# Patient Record
Sex: FEMALE | Race: BLACK OR AFRICAN AMERICAN | NOT HISPANIC OR LATINO | URBAN - METROPOLITAN AREA
[De-identification: names, ages, dates, MRNs, and addresses within clinical notes are randomized per-mention and may not be internally consistent; named-entity substitution may affect disease eponyms.]

---

## 2023-03-24 LAB
ALANINE AMINOTRANSFERASE (SGPT) (U/L) IN SER/PLAS: 9 U/L (ref 7–45)
ALBUMIN (G/DL) IN SER/PLAS: 4.4 G/DL (ref 3.4–5)
ALKALINE PHOSPHATASE (U/L) IN SER/PLAS: 74 U/L (ref 33–110)
ANION GAP IN SER/PLAS: 9 MMOL/L (ref 10–20)
ASPARTATE AMINOTRANSFERASE (SGOT) (U/L) IN SER/PLAS: 12 U/L (ref 9–39)
BASOPHILS (10*3/UL) IN BLOOD BY AUTOMATED COUNT: 0.04 X10E9/L (ref 0–0.1)
BASOPHILS/100 LEUKOCYTES IN BLOOD BY AUTOMATED COUNT: 0.4 % (ref 0–2)
BILIRUBIN TOTAL (MG/DL) IN SER/PLAS: 0.5 MG/DL (ref 0–1.2)
CALCIDIOL (25 OH VITAMIN D3) (NG/ML) IN SER/PLAS: 12 NG/ML
CALCIUM (MG/DL) IN SER/PLAS: 9.8 MG/DL (ref 8.6–10.6)
CARBON DIOXIDE, TOTAL (MMOL/L) IN SER/PLAS: 29 MMOL/L (ref 21–32)
CHLORIDE (MMOL/L) IN SER/PLAS: 103 MMOL/L (ref 98–107)
CHOLESTEROL (MG/DL) IN SER/PLAS: 197 MG/DL (ref 0–199)
CHOLESTEROL IN HDL (MG/DL) IN SER/PLAS: 42.5 MG/DL
CHOLESTEROL/HDL RATIO: 4.6
COBALAMIN (VITAMIN B12) (PG/ML) IN SER/PLAS: 409 PG/ML (ref 211–911)
CREATININE (MG/DL) IN SER/PLAS: 0.69 MG/DL (ref 0.5–1.05)
EOSINOPHILS (10*3/UL) IN BLOOD BY AUTOMATED COUNT: 0.27 X10E9/L (ref 0–0.7)
EOSINOPHILS/100 LEUKOCYTES IN BLOOD BY AUTOMATED COUNT: 2.7 % (ref 0–6)
ERYTHROCYTE DISTRIBUTION WIDTH (RATIO) BY AUTOMATED COUNT: 13.2 % (ref 11.5–14.5)
ERYTHROCYTE MEAN CORPUSCULAR HEMOGLOBIN CONCENTRATION (G/DL) BY AUTOMATED: 33 G/DL (ref 32–36)
ERYTHROCYTE MEAN CORPUSCULAR VOLUME (FL) BY AUTOMATED COUNT: 88 FL (ref 80–100)
ERYTHROCYTES (10*6/UL) IN BLOOD BY AUTOMATED COUNT: 4.36 X10E12/L (ref 4–5.2)
ESTIMATED AVERAGE GLUCOSE FOR HBA1C: 108 MG/DL
FERRITIN (UG/LL) IN SER/PLAS: 48 UG/L (ref 8–150)
GFR FEMALE: >90 ML/MIN/1.73M2
GLUCOSE (MG/DL) IN SER/PLAS: 85 MG/DL (ref 74–99)
HEMATOCRIT (%) IN BLOOD BY AUTOMATED COUNT: 38.2 % (ref 36–46)
HEMOGLOBIN (G/DL) IN BLOOD: 12.6 G/DL (ref 12–16)
HEMOGLOBIN A1C/HEMOGLOBIN TOTAL IN BLOOD: 5.4 %
IMMATURE GRANULOCYTES/100 LEUKOCYTES IN BLOOD BY AUTOMATED COUNT: 0.8 % (ref 0–0.9)
INSULIN, FASTING: 16 UIU/ML (ref 3–25)
IRON (UG/DL) IN SER/PLAS: 48 UG/DL (ref 35–150)
IRON BINDING CAPACITY (UG/DL) IN SER/PLAS: 371 UG/DL (ref 240–445)
IRON SATURATION (%) IN SER/PLAS: 13 % (ref 25–45)
LDL: 131 MG/DL (ref 0–99)
LEUKOCYTES (10*3/UL) IN BLOOD BY AUTOMATED COUNT: 10.1 X10E9/L (ref 4.4–11.3)
LYMPHOCYTES (10*3/UL) IN BLOOD BY AUTOMATED COUNT: 3.9 X10E9/L (ref 1.2–4.8)
LYMPHOCYTES/100 LEUKOCYTES IN BLOOD BY AUTOMATED COUNT: 38.8 % (ref 13–44)
MONOCYTES (10*3/UL) IN BLOOD BY AUTOMATED COUNT: 0.59 X10E9/L (ref 0.1–1)
MONOCYTES/100 LEUKOCYTES IN BLOOD BY AUTOMATED COUNT: 5.9 % (ref 2–10)
NEUTROPHILS (10*3/UL) IN BLOOD BY AUTOMATED COUNT: 5.18 X10E9/L (ref 1.2–7.7)
NEUTROPHILS/100 LEUKOCYTES IN BLOOD BY AUTOMATED COUNT: 51.4 % (ref 40–80)
NRBC (PER 100 WBCS) BY AUTOMATED COUNT: 0 /100 WBC (ref 0–0)
PLATELETS (10*3/UL) IN BLOOD AUTOMATED COUNT: 322 X10E9/L (ref 150–450)
POTASSIUM (MMOL/L) IN SER/PLAS: 3.9 MMOL/L (ref 3.5–5.3)
PROTEIN TOTAL: 7 G/DL (ref 6.4–8.2)
SODIUM (MMOL/L) IN SER/PLAS: 137 MMOL/L (ref 136–145)
THYROTROPIN (MIU/L) IN SER/PLAS BY DETECTION LIMIT <= 0.05 MIU/L: 0.45 MIU/L (ref 0.44–3.98)
TRIGLYCERIDE (MG/DL) IN SER/PLAS: 120 MG/DL (ref 0–149)
UREA NITROGEN (MG/DL) IN SER/PLAS: 7 MG/DL (ref 6–23)
VLDL: 24 MG/DL (ref 0–40)

## 2024-11-05 ENCOUNTER — APPOINTMENT (OUTPATIENT)
Dept: OBSTETRICS AND GYNECOLOGY | Facility: HOSPITAL | Age: 34
End: 2024-11-05
Payer: COMMERCIAL

## 2024-11-19 ENCOUNTER — LAB (OUTPATIENT)
Dept: LAB | Facility: LAB | Age: 34
End: 2024-11-19
Payer: COMMERCIAL

## 2024-11-19 ENCOUNTER — OFFICE VISIT (OUTPATIENT)
Dept: OBSTETRICS AND GYNECOLOGY | Facility: HOSPITAL | Age: 34
End: 2024-11-19
Payer: COMMERCIAL

## 2024-11-19 VITALS
DIASTOLIC BLOOD PRESSURE: 93 MMHG | HEIGHT: 63 IN | SYSTOLIC BLOOD PRESSURE: 131 MMHG | BODY MASS INDEX: 44.83 KG/M2 | WEIGHT: 253 LBS

## 2024-11-19 DIAGNOSIS — Z11.3 ROUTINE SCREENING FOR STI (SEXUALLY TRANSMITTED INFECTION): ICD-10-CM

## 2024-11-19 DIAGNOSIS — Z12.4 CERVICAL CANCER SCREENING: ICD-10-CM

## 2024-11-19 DIAGNOSIS — Z01.419 ENCOUNTER FOR GYNECOLOGICAL EXAMINATION WITHOUT ABNORMAL FINDING: Primary | ICD-10-CM

## 2024-11-19 LAB
HCV AB SER QL: NONREACTIVE
HIV 1+2 AB+HIV1 P24 AG SERPL QL IA: NONREACTIVE
TREPONEMA PALLIDUM IGG+IGM AB [PRESENCE] IN SERUM OR PLASMA BY IMMUNOASSAY: NONREACTIVE

## 2024-11-19 PROCEDURE — 87389 HIV-1 AG W/HIV-1&-2 AB AG IA: CPT

## 2024-11-19 PROCEDURE — 86780 TREPONEMA PALLIDUM: CPT

## 2024-11-19 PROCEDURE — 36415 COLL VENOUS BLD VENIPUNCTURE: CPT

## 2024-11-19 PROCEDURE — 99385 PREV VISIT NEW AGE 18-39: CPT | Performed by: ADVANCED PRACTICE MIDWIFE

## 2024-11-19 PROCEDURE — 3008F BODY MASS INDEX DOCD: CPT | Performed by: ADVANCED PRACTICE MIDWIFE

## 2024-11-19 PROCEDURE — 87491 CHLMYD TRACH DNA AMP PROBE: CPT | Performed by: ADVANCED PRACTICE MIDWIFE

## 2024-11-19 PROCEDURE — 86803 HEPATITIS C AB TEST: CPT

## 2024-11-19 PROCEDURE — 99395 PREV VISIT EST AGE 18-39: CPT | Performed by: ADVANCED PRACTICE MIDWIFE

## 2024-11-19 SDOH — ECONOMIC STABILITY: FOOD INSECURITY: WITHIN THE PAST 12 MONTHS, YOU WORRIED THAT YOUR FOOD WOULD RUN OUT BEFORE YOU GOT MONEY TO BUY MORE.: NEVER TRUE

## 2024-11-19 SDOH — ECONOMIC STABILITY: FOOD INSECURITY: WITHIN THE PAST 12 MONTHS, THE FOOD YOU BOUGHT JUST DIDN'T LAST AND YOU DIDN'T HAVE MONEY TO GET MORE.: NEVER TRUE

## 2024-11-19 ASSESSMENT — PATIENT HEALTH QUESTIONNAIRE - PHQ9
1. LITTLE INTEREST OR PLEASURE IN DOING THINGS: SEVERAL DAYS
SUM OF ALL RESPONSES TO PHQ9 QUESTIONS 1 & 2: 4
6. FEELING BAD ABOUT YOURSELF - OR THAT YOU ARE A FAILURE OR HAVE LET YOURSELF OR YOUR FAMILY DOWN: MORE THAN HALF THE DAYS
2. FEELING DOWN, DEPRESSED OR HOPELESS: NEARLY EVERY DAY
4. FEELING TIRED OR HAVING LITTLE ENERGY: SEVERAL DAYS
10. IF YOU CHECKED OFF ANY PROBLEMS, HOW DIFFICULT HAVE THESE PROBLEMS MADE IT FOR YOU TO DO YOUR WORK, TAKE CARE OF THINGS AT HOME, OR GET ALONG WITH OTHER PEOPLE: SOMEWHAT DIFFICULT
SUM OF ALL RESPONSES TO PHQ QUESTIONS 1-9: 11
9. THOUGHTS THAT YOU WOULD BE BETTER OFF DEAD, OR OF HURTING YOURSELF: NOT AT ALL
8. MOVING OR SPEAKING SO SLOWLY THAT OTHER PEOPLE COULD HAVE NOTICED. OR THE OPPOSITE, BEING SO FIGETY OR RESTLESS THAT YOU HAVE BEEN MOVING AROUND A LOT MORE THAN USUAL: NOT AT ALL
3. TROUBLE FALLING OR STAYING ASLEEP: NEARLY EVERY DAY
5. POOR APPETITE OR OVEREATING: SEVERAL DAYS
7. TROUBLE CONCENTRATING ON THINGS, SUCH AS READING THE NEWSPAPER OR WATCHING TELEVISION: NOT AT ALL

## 2024-11-19 ASSESSMENT — ENCOUNTER SYMPTOMS
LOSS OF SENSATION IN FEET: 0
DEPRESSION: 0
OCCASIONAL FEELINGS OF UNSTEADINESS: 0

## 2024-11-19 ASSESSMENT — SOCIAL DETERMINANTS OF HEALTH (SDOH)
WITHIN THE LAST YEAR, HAVE TO BEEN RAPED OR FORCED TO HAVE ANY KIND OF SEXUAL ACTIVITY BY YOUR PARTNER OR EX-PARTNER?: NO
WITHIN THE LAST YEAR, HAVE YOU BEEN KICKED, HIT, SLAPPED, OR OTHERWISE PHYSICALLY HURT BY YOUR PARTNER OR EX-PARTNER?: NO
WITHIN THE LAST YEAR, HAVE YOU BEEN AFRAID OF YOUR PARTNER OR EX-PARTNER?: NO
WITHIN THE LAST YEAR, HAVE YOU BEEN HUMILIATED OR EMOTIONALLY ABUSED IN OTHER WAYS BY YOUR PARTNER OR EX-PARTNER?: NO

## 2024-11-19 ASSESSMENT — PAIN SCALES - GENERAL: PAINLEVEL_OUTOF10: 0-NO PAIN

## 2024-11-19 NOTE — PROGRESS NOTES
"Subjective   Meghan Winston is a 34 y.o. female who is here for Annual Exam (STI TESTING/Last pap 2011/Declined chaperone [facility]).     Concerns today:  None    Periods are regular every 28-30 days, lasting 4 days.   Dysmenorrhea:none.   Cyclic symptoms include none.      Sexual Activity: sexually active, male partner  Pain with intercourse? No   Loss of desire? No   Able to have an orgasm? Yes     History of prior STI: trichomonas  Desires STI screening? Yes    Current contraception: tubal ligation    Last pap: 2020  History of abnormal Pap smear: no  Family history of uterine or ovarian cancer: no  Family history of breast cancer: no    OB History    Para Term  AB Living   5 3 3 0 1 3   SAB IAB Ectopic Multiple Live Births   1 0 0 0 3      Objective   BP (!) 131/93   Ht 1.6 m (5' 3\")   Wt 115 kg (253 lb)   LMP 2024 (Approximate)    Physical Exam  Constitutional:       General: She is not in acute distress.     Appearance: Normal appearance. She is well-developed.   Genitourinary:      Urethral meatus normal.      No lesions in the vagina.      Genitourinary Comments: Menses discharge      Right Labia: No rash, lesions or skin changes.     Left Labia: No lesions, skin changes or rash.     Vaginal discharge present.      No vaginal erythema or bleeding.      No vaginal prolapse present.     No vaginal atrophy present.       Right Adnexa: not tender and no mass present.     Left Adnexa: not tender and no mass present.     Cervix is parous.      No cervical motion tenderness, discharge, friability or lesion.      Uterus is not fixed, tender or irregular.      No uterine mass detected.     Uterus is anteverted.      Pelvic exam was performed with patient in the lithotomy position.   Breasts:     Right: Normal.      Left: Normal.   Cardiovascular:      Heart sounds: Normal heart sounds.   Pulmonary:      Effort: Pulmonary effort is normal.      Breath sounds: Normal breath sounds. "   Abdominal:      Palpations: Abdomen is soft. There is no mass.      Tenderness: There is no abdominal tenderness.   Neurological:      General: No focal deficit present.   Skin:     General: Skin is warm and dry.   Psychiatric:         Mood and Affect: Mood and affect normal.         Behavior: Behavior is cooperative.   Vitals reviewed.        Assessment/Plan   Diagnoses and all orders for this visit:  Encounter for gynecological examination without abnormal finding  -     routine AE  -     healthy lifestyle encouraged  -     pap & HPV collected  -     condoms as needed for STI prevention  -     BTL for contraception  -     will check vaccination record for Gardasil  -     Recommend PCP visit for routine health maintenance  -     RTO 1 year for AE or sooner PRN  Cervical cancer screening  -     THINPREP PAP TEST (>30) collected  Routine screening for STI (sexually transmitted infection)  -     GC/CT, trich from pap  -     Hepatitis C Antibody; Future  -     HIV 1/2 Antigen/Antibody Screen with Reflex to Confirmation; Future  -     Syphilis Screen with Reflex; Future    Follow up in about 1 year (around 11/19/2025) for Annual Exam.  Marycarmen Green, APRN-CNM, APRN-CNP

## 2024-11-20 LAB
C TRACH RRNA SPEC QL NAA+PROBE: NEGATIVE
N GONORRHOEA DNA SPEC QL PROBE+SIG AMP: NEGATIVE

## 2024-11-26 NOTE — PROGRESS NOTES
11/27/2024 CC: 34 y.o. presents for refraction check up    Past ocular history: glasses  Family history: Glaucoma in father and aunt.  Past medical history: Asthma  Social history: smoker    Eye medications:   Both eyes: none    Allergy:  As per chart     Testing:    Pachymetry: 549/555    Assessment:   Refractive error:  -Low myopia/ast.   -MRx    Fhx of glaucoma  - IOP 14 OU, Average C  - Symmetrical CDR 0.3    VANESSA    Plan:   I explained my findings. Mrx dispensed    Eye medications:   Both eyes: Start Ats PRN    Return in 1 yr, Comprehensive eval. (DFE)

## 2024-11-27 ENCOUNTER — APPOINTMENT (OUTPATIENT)
Dept: OPHTHALMOLOGY | Facility: CLINIC | Age: 34
End: 2024-11-27
Payer: COMMERCIAL

## 2024-11-27 DIAGNOSIS — H52.7 REFRACTIVE ERROR: Primary | ICD-10-CM

## 2024-11-27 PROCEDURE — 92004 COMPRE OPH EXAM NEW PT 1/>: CPT | Performed by: OPHTHALMOLOGY

## 2024-11-27 PROCEDURE — 92015 DETERMINE REFRACTIVE STATE: CPT | Performed by: OPHTHALMOLOGY

## 2024-11-27 PROCEDURE — 76514 ECHO EXAM OF EYE THICKNESS: CPT | Performed by: OPHTHALMOLOGY

## 2024-11-27 RX ORDER — ERGOCALCIFEROL 1.25 MG/1
CAPSULE ORAL
COMMUNITY
Start: 2023-04-20

## 2024-11-27 ASSESSMENT — VISUAL ACUITY
OS_CC+: +2
OS_CC: 20/30
OD_CC: 20/30
METHOD: SNELLEN - LINEAR

## 2024-11-27 ASSESSMENT — CUP TO DISC RATIO
OD_RATIO: 0.3
OS_RATIO: 0.3

## 2024-11-27 ASSESSMENT — REFRACTION_MANIFEST
OD_SPHERE: -0.25
OD_AXIS: 002
OD_CYLINDER: SPHERE
OD_CYLINDER: +0.50
OS_CYLINDER: +0.25
OS_CYLINDER: +0.25
METHOD_AUTOREFRACTION: 1
OD_SPHERE: -0.50
OS_SPHERE: -0.25
OS_AXIS: 029
OS_SPHERE: -0.50
OS_AXIS: 029

## 2024-11-27 ASSESSMENT — ENCOUNTER SYMPTOMS
ALLERGIC/IMMUNOLOGIC NEGATIVE: 0
GASTROINTESTINAL NEGATIVE: 0
MUSCULOSKELETAL NEGATIVE: 0
HEMATOLOGIC/LYMPHATIC NEGATIVE: 0
ENDOCRINE NEGATIVE: 0
CARDIOVASCULAR NEGATIVE: 0
PSYCHIATRIC NEGATIVE: 0
CONSTITUTIONAL NEGATIVE: 0
NEUROLOGICAL NEGATIVE: 0
EYES NEGATIVE: 1
RESPIRATORY NEGATIVE: 0

## 2024-11-27 ASSESSMENT — CONF VISUAL FIELD
OD_SUPERIOR_TEMPORAL_RESTRICTION: 0
OS_SUPERIOR_TEMPORAL_RESTRICTION: 0
OS_INFERIOR_NASAL_RESTRICTION: 0
OD_INFERIOR_NASAL_RESTRICTION: 0
OD_INFERIOR_TEMPORAL_RESTRICTION: 0
OD_NORMAL: 1
OS_SUPERIOR_NASAL_RESTRICTION: 0
OS_NORMAL: 1
OS_INFERIOR_TEMPORAL_RESTRICTION: 0
OD_SUPERIOR_NASAL_RESTRICTION: 0

## 2024-11-27 ASSESSMENT — SLIT LAMP EXAM - LIDS
COMMENTS: NORMAL
COMMENTS: NORMAL

## 2024-11-27 ASSESSMENT — TONOMETRY
IOP_METHOD: GOLDMANN APPLANATION
OS_IOP_MMHG: 14
OD_IOP_MMHG: 14

## 2024-11-27 ASSESSMENT — EXTERNAL EXAM - LEFT EYE: OS_EXAM: NORMAL

## 2024-11-27 ASSESSMENT — PACHYMETRY
OS_CT(UM): 555
OD_CT(UM): 549

## 2024-11-27 ASSESSMENT — EXTERNAL EXAM - RIGHT EYE: OD_EXAM: NORMAL

## 2024-12-04 LAB
CYTOLOGY CMNT CVX/VAG CYTO-IMP: NORMAL
HPV HR 12 DNA GENITAL QL NAA+PROBE: NEGATIVE
HPV HR GENOTYPES PNL CVX NAA+PROBE: NEGATIVE
HPV16 DNA SPEC QL NAA+PROBE: NEGATIVE
HPV18 DNA SPEC QL NAA+PROBE: NEGATIVE
LAB AP HPV GENOTYPE QUESTION: YES
LAB AP HPV HR: NORMAL
LAB AP PAP ADDITIONAL TESTS: NORMAL
LABORATORY COMMENT REPORT: NORMAL
LMP START DATE: NORMAL
PATH REPORT.TOTAL CANCER: NORMAL

## 2025-01-03 ENCOUNTER — APPOINTMENT (OUTPATIENT)
Dept: PRIMARY CARE | Facility: CLINIC | Age: 35
End: 2025-01-03
Payer: COMMERCIAL

## 2025-01-30 ENCOUNTER — PHARMACY VISIT (OUTPATIENT)
Dept: PHARMACY | Facility: CLINIC | Age: 35
End: 2025-01-30
Payer: MEDICAID

## 2025-01-30 ENCOUNTER — CLINICAL SUPPORT (OUTPATIENT)
Dept: EMERGENCY MEDICINE | Facility: HOSPITAL | Age: 35
End: 2025-01-30
Payer: COMMERCIAL

## 2025-01-30 ENCOUNTER — APPOINTMENT (OUTPATIENT)
Dept: RADIOLOGY | Facility: HOSPITAL | Age: 35
End: 2025-01-30
Payer: COMMERCIAL

## 2025-01-30 ENCOUNTER — HOSPITAL ENCOUNTER (EMERGENCY)
Facility: HOSPITAL | Age: 35
Discharge: HOME | End: 2025-01-30
Attending: STUDENT IN AN ORGANIZED HEALTH CARE EDUCATION/TRAINING PROGRAM
Payer: COMMERCIAL

## 2025-01-30 VITALS
BODY MASS INDEX: 44.82 KG/M2 | RESPIRATION RATE: 20 BRPM | OXYGEN SATURATION: 96 % | DIASTOLIC BLOOD PRESSURE: 85 MMHG | TEMPERATURE: 97.2 F | SYSTOLIC BLOOD PRESSURE: 135 MMHG | HEIGHT: 63 IN | HEART RATE: 87 BPM

## 2025-01-30 DIAGNOSIS — J06.9 VIRAL URI: Primary | ICD-10-CM

## 2025-01-30 LAB
ATRIAL RATE: 83 BPM
FLUAV RNA RESP QL NAA+PROBE: NOT DETECTED
FLUBV RNA RESP QL NAA+PROBE: NOT DETECTED
P AXIS: 50 DEGREES
P OFFSET: 202 MS
P ONSET: 151 MS
PR INTERVAL: 140 MS
Q ONSET: 221 MS
QRS COUNT: 14 BEATS
QRS DURATION: 94 MS
QT INTERVAL: 398 MS
QTC CALCULATION(BAZETT): 467 MS
QTC FREDERICIA: 443 MS
R AXIS: 35 DEGREES
SARS-COV-2 RNA RESP QL NAA+PROBE: NOT DETECTED
T AXIS: 8 DEGREES
T OFFSET: 420 MS
VENTRICULAR RATE: 83 BPM

## 2025-01-30 PROCEDURE — 99284 EMERGENCY DEPT VISIT MOD MDM: CPT | Performed by: STUDENT IN AN ORGANIZED HEALTH CARE EDUCATION/TRAINING PROGRAM

## 2025-01-30 PROCEDURE — 71046 X-RAY EXAM CHEST 2 VIEWS: CPT

## 2025-01-30 PROCEDURE — RXMED WILLOW AMBULATORY MEDICATION CHARGE

## 2025-01-30 PROCEDURE — 87636 SARSCOV2 & INF A&B AMP PRB: CPT | Performed by: STUDENT IN AN ORGANIZED HEALTH CARE EDUCATION/TRAINING PROGRAM

## 2025-01-30 PROCEDURE — 2500000002 HC RX 250 W HCPCS SELF ADMINISTERED DRUGS (ALT 637 FOR MEDICARE OP, ALT 636 FOR OP/ED): Mod: SE | Performed by: STUDENT IN AN ORGANIZED HEALTH CARE EDUCATION/TRAINING PROGRAM

## 2025-01-30 PROCEDURE — 94640 AIRWAY INHALATION TREATMENT: CPT

## 2025-01-30 PROCEDURE — 99285 EMERGENCY DEPT VISIT HI MDM: CPT | Mod: 25 | Performed by: STUDENT IN AN ORGANIZED HEALTH CARE EDUCATION/TRAINING PROGRAM

## 2025-01-30 PROCEDURE — 93005 ELECTROCARDIOGRAM TRACING: CPT

## 2025-01-30 PROCEDURE — 71046 X-RAY EXAM CHEST 2 VIEWS: CPT | Mod: FOREIGN READ | Performed by: RADIOLOGY

## 2025-01-30 PROCEDURE — 93010 ELECTROCARDIOGRAM REPORT: CPT | Performed by: STUDENT IN AN ORGANIZED HEALTH CARE EDUCATION/TRAINING PROGRAM

## 2025-01-30 RX ORDER — AMOXICILLIN AND CLAVULANATE POTASSIUM 875; 125 MG/1; MG/1
1 TABLET, FILM COATED ORAL EVERY 12 HOURS
Qty: 14 TABLET | Refills: 0 | Status: SHIPPED | OUTPATIENT
Start: 2025-01-30 | End: 2025-01-30

## 2025-01-30 RX ORDER — AMOXICILLIN AND CLAVULANATE POTASSIUM 875; 125 MG/1; MG/1
1 TABLET, FILM COATED ORAL EVERY 12 HOURS
Qty: 14 TABLET | Refills: 0 | Status: SHIPPED | OUTPATIENT
Start: 2025-01-30 | End: 2025-02-06

## 2025-01-30 RX ORDER — IPRATROPIUM BROMIDE AND ALBUTEROL SULFATE 2.5; .5 MG/3ML; MG/3ML
SOLUTION RESPIRATORY (INHALATION)
Status: DISCONTINUED
Start: 2025-01-30 | End: 2025-01-30

## 2025-01-30 RX ADMIN — IPRATROPIUM BROMIDE AND ALBUTEROL SULFATE: .5; 3 SOLUTION RESPIRATORY (INHALATION) at 05:54

## 2025-01-30 ASSESSMENT — PAIN DESCRIPTION - PAIN TYPE: TYPE: ACUTE PAIN

## 2025-01-30 ASSESSMENT — COLUMBIA-SUICIDE SEVERITY RATING SCALE - C-SSRS
1. IN THE PAST MONTH, HAVE YOU WISHED YOU WERE DEAD OR WISHED YOU COULD GO TO SLEEP AND NOT WAKE UP?: NO
2. HAVE YOU ACTUALLY HAD ANY THOUGHTS OF KILLING YOURSELF?: NO
6. HAVE YOU EVER DONE ANYTHING, STARTED TO DO ANYTHING, OR PREPARED TO DO ANYTHING TO END YOUR LIFE?: NO

## 2025-01-30 ASSESSMENT — PAIN SCALES - GENERAL: PAINLEVEL_OUTOF10: 8

## 2025-01-30 ASSESSMENT — PAIN - FUNCTIONAL ASSESSMENT: PAIN_FUNCTIONAL_ASSESSMENT: 0-10

## 2025-01-30 ASSESSMENT — PAIN DESCRIPTION - LOCATION: LOCATION: OTHER (COMMENT)

## 2025-01-30 NOTE — ED TRIAGE NOTES
Patient reports abdominal pain, n/v, back pain, cough, chest tigntness since Thursday. Hx of asthma, she is taking her inhaler and nebulizers @ home. Patient does have wheezing on auscultation. She is able to speak in complete sentences satting 96% on RA.

## 2025-01-30 NOTE — DISCHARGE INSTRUCTIONS
Seen in the emergency department for nasal congestion, cough, and some shortness of breath.  We discussed the workup here in the emergency department is most likely an upper respiratory tract infection.  Please take the Augmentin, twice a day for the next 7 days.  If you develop any worsening shortness of breath, any worsening chest pain that is not present only when you cough, any leg swelling, any feelings of passing out or fever please return the emergency department immediately.  Otherwise please follow-up with your primary care doctor.

## 2025-01-30 NOTE — ED PROVIDER NOTES
"[facility] ED Note    Date of Service: 1/30/2025  Reason for Visit: Flu Symptoms      Patient History     HPI  Meghan Winston is a 35 y.o. female with past medical history of asthma who presents to the emergency department for a week history of URI symptoms as well as generalized myalgias.  Patient states about a week ago she started developing some nasal congestion, intermittently productive cough, with diffuse myalgias.  No fever.  She states that she does have some chest pain only when she coughs, no chest pain in the absence of coughing.  She states that she has had a little bit of shortness of breath consistent with her underlying asthma, she states that she has used albuterol at home with significant improvement.  She states she presented to the ED because of the ongoing symptoms and concern for possible pneumonia given that she is not getting any better.  She denies any abdominal pain, nausea/vomiting, lower extremity edema and rash.      Past Medical History:   Diagnosis Date    Bipolar depression (Multi)      Past Surgical History:   Procedure Laterality Date    TUBAL LIGATION Bilateral          Physical Exam     Vitals:    01/30/25 0213   BP: 135/85   Pulse: 87   Resp: 20   Temp: 36.2 °C (97.2 °F)   TempSrc: Temporal   SpO2: 96%   Height: 1.6 m (5' 3\")     General: Well-appearing female, no acute distress, sitting comfortably in the gurney.  ENT: Nasal congestion appreciated.  Pulmonary: Nonlabored breathing.  Clear breath sounds bilaterally.  No wheezes appreciated.  Symmetric chest rise.  Cardiac:  Regular rate   Abdomen:  Soft. Non-tender. Non-distended  Musculoskeletal: No peripheral edema   Skin:  Dry, no rashes    Diagnostic Studies     Labs:  Please see EMR for labs obtained during this patient encounter.    Radiology:  Please see EMR for imaging obtained during this patient encounter.    EKG:  Normal sinus rhythm, ventricular rate of 83.  Normal axis.  Normal TX interval of 140.  Normal " ventricular conduction with a QRS duration of 94 and a prolonged QTc interval 467.  No Q waves appreciated, normal ST segments, T wave inversions seen in lead III and aVF with T wave flattening seen in lead V3.  Relatively unchanged prior KG on 6/4/2023.    ED Course and MDM     Meghan Winston is a 35 y.o. female with a history and presentation as described above in HPI.      Upon presentation, the patient was afebrile, well-appearing, with unremarkable vital signs.  Patient presented to the emergency department for nasal congestion, cough, and slight shortness of breath.  Differential diagnosis includes ACS, pneumonia, asthma exacerbation or possible viral URI.  Patient did not have any increased work of breathing, did not have any wheezing, did not have any labored breathing with clear breath sounds I have low suspicion for asthma exacerbation.  She did not have any signs of pneumonia or abnormality on her chest x-ray.  Patient did not have any chest pain, chest pain was only present with cough, therefore suspect chest wall pain rather than any true cardiac pain.  EKG did not show any arrhythmias or ischemia.  Patient had a negative flu and COVID.  Given the duration of patient's upper respiratory symptoms, she will be given a 7-day course of Augmentin.  I did discuss with the patient return precautions including any development of chest pain outside of coughing, any worsening shortness of breath, any feelings of fainting, nausea or vomiting preventing her from taking her antibiotics or leg swelling.  Patient understood these risks factors and will plan to return if she experiences any of these.  She is instructed to follow-up with her primary care doctor.      Impression     1. Viral URI         Plan       Discharge, see DCI provided    Jared Ellis MD  [facility] Emergency Medicine         Jared Ellis MD  01/30/25 0751

## 2025-02-07 ENCOUNTER — TELEPHONE (OUTPATIENT)
Dept: SURGERY | Facility: CLINIC | Age: 35
End: 2025-02-07
Payer: COMMERCIAL

## 2025-02-07 NOTE — TELEPHONE ENCOUNTER
Attempted to call patient in regards to starting the bariatric surgery program at [facility]. Due to the surgeon the patient started with left . I reached out to get the patient scheduled with a new surgeon. I called M stating the above and left the office number to call back.

## 2025-02-11 ENCOUNTER — TELEPHONE (OUTPATIENT)
Dept: SLEEP MEDICINE | Facility: CLINIC | Age: 35
End: 2025-02-11
Payer: COMMERCIAL

## 2025-02-11 NOTE — TELEPHONE ENCOUNTER
Patient showed up on my screen as checked in but did not enter the video chat for her scheduled 4:30P appointment. Myself and office staff made two phone calls to her between 4:30P and 4:45P, she did not answer.   A second link was sent to the patient, still did not join the call.   If going to reschedule, recommend in person visit.

## 2025-02-25 ENCOUNTER — APPOINTMENT (OUTPATIENT)
Dept: SURGERY | Facility: CLINIC | Age: 35
End: 2025-02-25
Payer: COMMERCIAL

## 2025-02-27 NOTE — PROGRESS NOTES
Surgeon: Dr. Lino  Patient is considering: RYGB    ASSESSMENT:   Current weight: 251 lbs Ht: 63 In.  BMI: 44.46         Initial start weight: 251 lbs   Pre-Op Excess Body Weight (EBW): 110 lbs   Target Post-Op weight goal: 163-185 lbs     Food allergies/intolerances: Eggs  Chewing/Swallowing/Dentition: Pt reports no problems with chewing/swallowing but partially missing teeth (front two)  Diarrhea/Constipation: No   Smoking/Tobacco use: Yes; pt reports smoking 2 cigars every other day.   Vitamins/Minerals supplements: None   Past diet attempts: Low calorie; Low carb; Starvation   Hours of sleep/night: 6  Current exercise: no regimen. Pt reports just purchasing an at home machine and plans to use it.     DIET RECALL: 1 meal/day on average. Pt reports low hunger.   Breakfast: skips   Lunch: skips   Dinner: usually a protein (ex: baked chicken, a pork chop) with a vegetable (ex: green beans, broccoli) and rice   Snacks: none   Beverages: water, a lot of Ginger Ale per pt, some juice, an occasional tea with honey and lemon, and an occasional coffee with cream and sugar (1-2x/month)  Alcohol: wine every other Friday; pt reports consuming a 4 pack of mini wine bottles (total of 748 ml)     Person responsible for cooking & shopping? Herself   How often do you eat sweet snacks? 2x/week   How often do you eat savory snacks? 2x/week   How often do you eat out? Rarely. Choices include fast food, restaurants, delivery   Do you feel overly stuffed? No   Binge Eating? No   Night Eating? No   Emotional Eating? Occasionally with stress and depression. Food choices include sweets    READINESS TO LEARN:  Motivation to learn: Interested        Understanding of instruction: Good   Anticipated Compliance: Good   Family Support: Unable to assess - family not present          Educational Materials Provided:   Bariatric nutrition guide   Goals sheet     Nutrition assessment completed today. Pt will be scheduled for a video education  class at a later date to discuss the 2 week pre op diet, post op protein and fluid goals, vitamin and mineral supplementation, exercise goals, and post op diet progression.     Nutrition Diagnosis:   1. Overweight/obesity related to excess energy intake as evidenced by BMI = 40 kg/m^2.  2. Food- and nutrition-related knowledge deficit related to lack of prior exposure to surgical weight loss information as evidenced by pt new to surgical program.    Patient was receptive to nutritional recommendations and verbalized understanding of the weight loss surgery diet. Patient expressed understanding about the importance of dietary compliance post-surgery to avoid nutritional deficiencies and achieve optimal weight loss and verbalized intent to follow dietary recommendations.    Nutrition Interventions:   1. Modify type and amount of food and nutrients within meals and snacks.  2. Comprehensive Nutrition Education    Recommendations/goals for next appointment:  1. Incorporate a lunch meal. Provided a tentative meal schedule of: 9am, 12-1pm, 4:30-6:30pm.  2. Reduce liquid calories.   3. Incorporate at home exercise machine 3x/week for at least 10 minutes.     Pre-op Goal weight: lose 5% of body weight    Nutrition Monitoring and Evaluation: 1-2 pound weight loss per week  Criteria: weight check    Patient does meet National Institutes Health guidelines for weight loss surgery, however needs to demonstrate consistent effort in making dietary changes before giving clearance. Will follow up next month and continue to monitor pt's weight, dietary & behavior changes.       Sheri Ennis RD, LDN  Registered Dietitian, Licensed Dietitian Nutritionist

## 2025-03-03 ENCOUNTER — TELEPHONE (OUTPATIENT)
Dept: SURGERY | Facility: CLINIC | Age: 35
End: 2025-03-03
Payer: COMMERCIAL

## 2025-03-11 ENCOUNTER — NUTRITION (OUTPATIENT)
Dept: SURGERY | Facility: CLINIC | Age: 35
End: 2025-03-11
Payer: COMMERCIAL

## 2025-03-11 ENCOUNTER — APPOINTMENT (OUTPATIENT)
Dept: SURGERY | Facility: CLINIC | Age: 35
End: 2025-03-11
Payer: COMMERCIAL

## 2025-03-11 VITALS — BODY MASS INDEX: 44.47 KG/M2 | WEIGHT: 251 LBS | HEIGHT: 63 IN

## 2025-03-11 DIAGNOSIS — E66.01 MORBID OBESITY (MULTI): ICD-10-CM

## 2025-03-17 PROBLEM — Z98.84 BARIATRIC SURGERY STATUS: Status: ACTIVE | Noted: 2025-03-17

## 2025-03-17 PROBLEM — E66.01 MORBID OBESITY (MULTI): Status: ACTIVE | Noted: 2025-03-17

## 2025-03-17 NOTE — H&P (VIEW-ONLY)
Subjective     Date: 3/20/2025 Time: 10:41 AM  Name: Meghan Winston  MRN: [id number]    This is a 35 y.o. female with morbid obesity (Body mass index is 45.35 kg/m².) who presents to clinic for consideration of bariatric surgery. she has attempted and failed multiple diet and exercise regimens for weight loss. Initial Onset of obesity was after pregnancy.  Their goal for surgery is to  be healthier  and lose weight. The patient has tried multiple diets to lose weight including Low Calorie. The patient was most successful with the low calorie diet. The most pounds lost on this diet were 20 lbs. The patient considers their dietary weakness to be  skipping meals and not making healthy food choices.  Most of her weight gain happened following pregnancy.  She did work with our bariatric nurse practitioner a few years ago.  She was on Trulicity for a couple of months, but stopped taking it after she did not see much weight loss.  She is interested in bariatric surgery in order to become healthier.  Currently she does not take any medications.  She was diagnosed at some point with bipolar depression, and was prescribed lamotrigine, but never started it.  She also smokes Black and mild cigars daily.    PSHx: Tubal ligation and  section    SLEEVE Gastric Surgery For Morbid Obesity Laparoscopic Longitudinal Gastrectomy     0 = No symptoms - Denies any GERD symptoms     PMH:   Past Medical History:   Diagnosis Date    Bipolar depression (Multi)         PSH:   Past Surgical History:   Procedure Laterality Date    TUBAL LIGATION Bilateral         FAMILY HISTORY:  Family History   Problem Relation Name Age of Onset    Heart attack Mother  52    Glaucoma Father      Diabetes Mother's Sister      Asthma Mother's Sister      Glaucoma Mother's Sister      Hypertension Mother's Sister      Gout Mother's Sister      Brain Aneurysm Mother's Sister  54    Diabetes Mother's Brother      Breast cancer Neg Hx      Ovarian cancer Neg  Hx      Colon cancer Neg Hx          SOCIAL HISTORY:  Social History     Tobacco Use    Smoking status: Some Days     Types: Cigars    Smokeless tobacco: Never    Tobacco comments:     2 black and milds per day   Vaping Use    Vaping status: Never Used   Substance Use Topics    Alcohol use: Not Currently     Alcohol/week: 3.0 standard drinks of alcohol     Types: 3 Glasses of wine per week     Comment: socially    Drug use: Not Currently     Frequency: 1.0 times per week     Types: Marijuana       MEDICATIONS:  Prior to Admission Medications:  Medication Documentation Review Audit       Reviewed by Yeni Yuen (Technician) on 11/27/24 at 1410      Medication Order Taking? Sig Documenting Provider Last Dose Status   dulaglutide (Trulicity) 0.75 mg/0.5 mL pen injector [id number] Yes Inject under the skin. Historical Provider, MD  Active   ergocalciferol (Vitamin D-2) 1.25 MG (42413 UT) capsule [id number] Yes Take by mouth. Historical Provider, MD  Active   -iron fum-folic acid 29 mg iron- 1 mg tablet [id number]  Take by mouth. Historical Provider, MD  Active                     ALLERGIES:  Allergies   Allergen Reactions    Bee Pollen Itching    Shellfish Derived Hives    Egg Hives, Unknown and Rash    Ibuprofen Hives, Itching and Rash       REVIEW OF SYSTEMS:  GENERAL: Negative for malaise, significant weight loss and fever  HEAD: Negative for headache, swelling.  NECK: Negative for lumps, goiter, pain and significant neck swelling  RESPIRATORY: Negative for cough, wheezing or shortness of breath.  CARDIOVASCULAR: Negative for chest pain, leg swelling or palpitations.  GI: Negative for abdominal discomfort, blood in stools or black stools or change in bowel habits  : No history of dysuria, frequency or incontinence  MUSCULOSKELETAL: Negative for joint pain or swelling, back pain or muscle pain.  SKIN: Negative for lesions, rash, and itching.  PSYCH: Negative for sleep disturbance, mood disorder and recent  psychosocial stressors.  ENDOCRINE: Negative for cold or heat intolerance, polyuria, polydipsia and goiter.    Objective   PHYSICAL EXAM:  Visit Vitals  Wt 116 kg (256 lb)   BMI 45.35 kg/m²   OB Status Having periods   Smoking Status Some Days   BSA 2.27 m²     General appearance: obese, NAD  Neuro: AOx3  Head: EOMI; no swelling or lesions of scalp or face  ENT:  no lumps or lymphadenopathy, thyroid normal to palpation; oropharynx clear, no swelling or erythema  Skin: warm, no erythema or rashes  Lungs: clear to percussion and auscultation  Heart: regular rhythm and S1, S2 normal  Abdomen: soft, non-tender, no masses, no organomegaly  Extremities: Normal exam of the extremities. No swelling or pain.  Psych: no hurried speech, no flight of ideas, normal affect    Assessment/Plan   IMPRESSION:  Meghan Winston is a 35 y.o. female with a BMI of Body mass index is 45.35 kg/m². with the following diagnoses and co-morbidities:     Past Medical History:   Diagnosis Date    Bipolar depression (Multi)        This patient does meet the criteria for a surgical weight loss procedure according to NIH guidelines.  The risks of sleeve gastrectomy, Carlos-en-Y gastric bypass surgery including but not limited to bleeding, leak along staple lines, infection, dehydration, ulcers, internal hernia, DVT/PE, pneumonia, myocardial infarction, prolonged nausea/vomiting, incomplete resolution of associated medical conditions, reflux, weight regain, vitamin/mineral deficiencies, and death have been explained to the patient and Meghan Winston has expressed understanding and acceptance of them.     We discussed the lifestyle changes necessary to be successful following surgery.    The increased risk of substance and alcohol abuse following bariatric surgery was discussed with the patient, along with the negative consequences of substance/alcohol use after surgery including addiction, worsening of mental health disorders, and injury to the  stomach. The risk of smoking and vaping (tobacco or any other substance) after bariatric surgery was explained to the patient. This includes risk of anastamotic ulcers, gastritis, bleeding, perforation, stricture, and PO intolerance.  The patient expressed understanding and acceptance of these risks.    The patient was advised not to become pregnant within 12-18 months following bariatric surgery. She was educated on the increased risks to mother and fetus associated with pregnancy within 2 years of bariatric surgery.    The benefits of the above surgeries including weight loss, improvement/resolution of associated medical and mental health conditions, improved mobility, and decreased mortality have been explained the the patient and Meghan Winston has expressed understanding and acceptance of them.      PLAN:  The plan of treatment for Meghan Winston is to continue with the consultations and tests ordered today in hopes of qualifying for pre-operative clearance for bariatric surgery. This includes:    Consult Nutrition for education and 6 months of MSWL  Consult Psychology  Consult Cardiology  Consult Pulmonology  Labs ordered  EGD  PCP for medical optimization  Consult sleep medicine - concern for RANDY  Recommend at least 10 lbs of weight loss prior to surgery.      The following are some lifestyle changes you should begin to prepare you for your surgery.   Eliminate soda and other carbonated beverages from your diet. Carbonation will not be well tolerated after surgery. Try Propel, Vitamin Water Zero, Sobe Lifewater, Crystal Light or water.    Increase fluid consumption to 64 oz daily. Do not drink within 30 minutes of eating as this will liquefy your food and make you hungry more quickly.    Exercise for 30-60 minutes daily. Brisk walking, bike riding and swimming are all examples of healthy exercise. If you are unable to exercise we recommend seated exercise.    Do not skip meals.    Take a multivitamin  daily.    Lose weight. In preparation for your surgery it is important that you begin making healthier food choices now. Our dietitian will meet with you to help you select foods lower in calories and higher in nutrition. We would like you to lose at least 10 lbs prior to surgery.     Increase your protein intake to 60 grams per day.    Alcohol is empty calories. Please eliminate while preparing for surgery.    Plan your meals.      General Instruction:   1) Use the information we gave you today to work through your insurance requirements and medical clearances.   2) These documents need to get faxed or emailed to the program navigators so they can submit them for approval from your insurance company.   3) Obtain labs today at a  facility. We will call you with any abnormalities and corrections you need to make.   4) Continue to work with your primary care doctor and other specialist so your other health problems are well controlled prior to your surgery.   5) Adopt the recommendations of the program dietician so you develop healthy eating patterns.   6) Work with the sleep team to get your sleep apnea treated to prevent other health problems .   7) Consider attending a support group to learn from other who have been through the process.   8) Come to the MSWL sessions.       60 minutes were spent with patient including history, physical exam, and education.    Kirk Lino MD

## 2025-03-17 NOTE — PATIENT INSTRUCTIONS
The following are some lifestyle changes you should begin to prepare you for your surgery.   Eliminate soda and other carbonated beverages from your diet. Carbonation will not be well tolerated after surgery. Try Propel, Vitamin Water Zero, Sobe Lifewater, Crystal Light or water.    Increase fluid consumption to 64 oz daily. Do not drink within 30 minutes of eating as this will liquefy your food and make you hungry more quickly.    Exercise for 30-60 minutes daily. Brisk walking, bike riding and swimming are all examples of healthy exercise. If you are unable to exercise we recommend seated exercise.    Do not skip meals.    Take a multivitamin daily.    Lose weight. In preparation for your surgery it is important that you begin making healthier food choices now. Our dietitian will meet with you to help you select foods lower in calories and higher in nutrition. We would like you to lose at least 5-10 lbs prior to surgery.     Increase your protein intake to 60 grams per day.    Alcohol is empty calories. Please eliminate while preparing for surgery.    Plan your meals.      General Instruction:   1) Use the information we gave you today to work through your insurance requirements and medical clearances.   2) These documents need to get faxed or emailed to the program navigators so they can submit them for approval from your insurance company.   3) Obtain labs today at a  facility. We will call you with any abnormalities and corrections you need to make.   4) Continue to work with your primary care doctor and other specialist so your other health problems are well controlled prior to your surgery.   5) Adopt the recommendations of the program dietician so you develop healthy eating patterns.   6) Work with the sleep team to get your sleep apnea treated to prevent other health problems .   7) Consider attending a support group to learn from other who have been through the process.   8) Come to the  MS[facility] sessions.

## 2025-03-17 NOTE — PROGRESS NOTES
Subjective     Date: 3/20/2025 Time: 10:41 AM  Name: Meghan Winston  MRN: [id number]    This is a 35 y.o. female with morbid obesity (Body mass index is 45.35 kg/m².) who presents to clinic for consideration of bariatric surgery. she has attempted and failed multiple diet and exercise regimens for weight loss. Initial Onset of obesity was after pregnancy.  Their goal for surgery is to  be healthier  and lose weight. The patient has tried multiple diets to lose weight including Low Calorie. The patient was most successful with the low calorie diet. The most pounds lost on this diet were 20 lbs. The patient considers their dietary weakness to be  skipping meals and not making healthy food choices.  Most of her weight gain happened following pregnancy.  She did work with our bariatric nurse practitioner a few years ago.  She was on Trulicity for a couple of months, but stopped taking it after she did not see much weight loss.  She is interested in bariatric surgery in order to become healthier.  Currently she does not take any medications.  She was diagnosed at some point with bipolar depression, and was prescribed lamotrigine, but never started it.  She also smokes Black and mild cigars daily.    PSHx: Tubal ligation and  section    SLEEVE Gastric Surgery For Morbid Obesity Laparoscopic Longitudinal Gastrectomy     0 = No symptoms - Denies any GERD symptoms     PMH:   Past Medical History:   Diagnosis Date    Bipolar depression (Multi)         PSH:   Past Surgical History:   Procedure Laterality Date    TUBAL LIGATION Bilateral         FAMILY HISTORY:  Family History   Problem Relation Name Age of Onset    Heart attack Mother  52    Glaucoma Father      Diabetes Mother's Sister      Asthma Mother's Sister      Glaucoma Mother's Sister      Hypertension Mother's Sister      Gout Mother's Sister      Brain Aneurysm Mother's Sister  54    Diabetes Mother's Brother      Breast cancer Neg Hx      Ovarian cancer Neg  Hx      Colon cancer Neg Hx          SOCIAL HISTORY:  Social History     Tobacco Use    Smoking status: Some Days     Types: Cigars    Smokeless tobacco: Never    Tobacco comments:     2 black and milds per day   Vaping Use    Vaping status: Never Used   Substance Use Topics    Alcohol use: Not Currently     Alcohol/week: 3.0 standard drinks of alcohol     Types: 3 Glasses of wine per week     Comment: socially    Drug use: Not Currently     Frequency: 1.0 times per week     Types: Marijuana       MEDICATIONS:  Prior to Admission Medications:  Medication Documentation Review Audit       Reviewed by Yeni Yuen (Technician) on 11/27/24 at 1410      Medication Order Taking? Sig Documenting Provider Last Dose Status   dulaglutide (Trulicity) 0.75 mg/0.5 mL pen injector [id number] Yes Inject under the skin. Historical Provider, MD  Active   ergocalciferol (Vitamin D-2) 1.25 MG (89973 UT) capsule [id number] Yes Take by mouth. Historical Provider, MD  Active   -iron fum-folic acid 29 mg iron- 1 mg tablet [id number]  Take by mouth. Historical Provider, MD  Active                     ALLERGIES:  Allergies   Allergen Reactions    Bee Pollen Itching    Shellfish Derived Hives    Egg Hives, Unknown and Rash    Ibuprofen Hives, Itching and Rash       REVIEW OF SYSTEMS:  GENERAL: Negative for malaise, significant weight loss and fever  HEAD: Negative for headache, swelling.  NECK: Negative for lumps, goiter, pain and significant neck swelling  RESPIRATORY: Negative for cough, wheezing or shortness of breath.  CARDIOVASCULAR: Negative for chest pain, leg swelling or palpitations.  GI: Negative for abdominal discomfort, blood in stools or black stools or change in bowel habits  : No history of dysuria, frequency or incontinence  MUSCULOSKELETAL: Negative for joint pain or swelling, back pain or muscle pain.  SKIN: Negative for lesions, rash, and itching.  PSYCH: Negative for sleep disturbance, mood disorder and recent  psychosocial stressors.  ENDOCRINE: Negative for cold or heat intolerance, polyuria, polydipsia and goiter.    Objective   PHYSICAL EXAM:  Visit Vitals  Wt 116 kg (256 lb)   BMI 45.35 kg/m²   OB Status Having periods   Smoking Status Some Days   BSA 2.27 m²     General appearance: obese, NAD  Neuro: AOx3  Head: EOMI; no swelling or lesions of scalp or face  ENT:  no lumps or lymphadenopathy, thyroid normal to palpation; oropharynx clear, no swelling or erythema  Skin: warm, no erythema or rashes  Lungs: clear to percussion and auscultation  Heart: regular rhythm and S1, S2 normal  Abdomen: soft, non-tender, no masses, no organomegaly  Extremities: Normal exam of the extremities. No swelling or pain.  Psych: no hurried speech, no flight of ideas, normal affect    Assessment/Plan   IMPRESSION:  Meghan Winston is a 35 y.o. female with a BMI of Body mass index is 45.35 kg/m². with the following diagnoses and co-morbidities:     Past Medical History:   Diagnosis Date    Bipolar depression (Multi)        This patient does meet the criteria for a surgical weight loss procedure according to NIH guidelines.  The risks of sleeve gastrectomy, Carlos-en-Y gastric bypass surgery including but not limited to bleeding, leak along staple lines, infection, dehydration, ulcers, internal hernia, DVT/PE, pneumonia, myocardial infarction, prolonged nausea/vomiting, incomplete resolution of associated medical conditions, reflux, weight regain, vitamin/mineral deficiencies, and death have been explained to the patient and Meghan Winston has expressed understanding and acceptance of them.     We discussed the lifestyle changes necessary to be successful following surgery.    The increased risk of substance and alcohol abuse following bariatric surgery was discussed with the patient, along with the negative consequences of substance/alcohol use after surgery including addiction, worsening of mental health disorders, and injury to the  stomach. The risk of smoking and vaping (tobacco or any other substance) after bariatric surgery was explained to the patient. This includes risk of anastamotic ulcers, gastritis, bleeding, perforation, stricture, and PO intolerance.  The patient expressed understanding and acceptance of these risks.    The patient was advised not to become pregnant within 12-18 months following bariatric surgery. She was educated on the increased risks to mother and fetus associated with pregnancy within 2 years of bariatric surgery.    The benefits of the above surgeries including weight loss, improvement/resolution of associated medical and mental health conditions, improved mobility, and decreased mortality have been explained the the patient and Meghan Winston has expressed understanding and acceptance of them.      PLAN:  The plan of treatment for Meghan Winston is to continue with the consultations and tests ordered today in hopes of qualifying for pre-operative clearance for bariatric surgery. This includes:    Consult Nutrition for education and 6 months of MS[facility]  Consult Psychology  Consult Cardiology  Consult Pulmonology  Labs ordered  EGD  PCP for medical optimization  Consult sleep medicine - concern for RANDY  Recommend at least 10 lbs of weight loss prior to surgery.      The following are some lifestyle changes you should begin to prepare you for your surgery.   Eliminate soda and other carbonated beverages from your diet. Carbonation will not be well tolerated after surgery. Try Propel, Vitamin Water Zero, Sobe Lifewater, Crystal Light or water.    Increase fluid consumption to 64 oz daily. Do not drink within 30 minutes of eating as this will liquefy your food and make you hungry more quickly.    Exercise for 30-60 minutes daily. Brisk walking, bike riding and swimming are all examples of healthy exercise. If you are unable to exercise we recommend seated exercise.    Do not skip meals.    Take a multivitamin  daily.    Lose weight. In preparation for your surgery it is important that you begin making healthier food choices now. Our dietitian will meet with you to help you select foods lower in calories and higher in nutrition. We would like you to lose at least 10 lbs prior to surgery.     Increase your protein intake to 60 grams per day.    Alcohol is empty calories. Please eliminate while preparing for surgery.    Plan your meals.      General Instruction:   1) Use the information we gave you today to work through your insurance requirements and medical clearances.   2) These documents need to get faxed or emailed to the program navigators so they can submit them for approval from your insurance company.   3) Obtain labs today at a  facility. We will call you with any abnormalities and corrections you need to make.   4) Continue to work with your primary care doctor and other specialist so your other health problems are well controlled prior to your surgery.   5) Adopt the recommendations of the program dietician so you develop healthy eating patterns.   6) Work with the sleep team to get your sleep apnea treated to prevent other health problems .   7) Consider attending a support group to learn from other who have been through the process.   8) Come to the MS[facility] sessions.       60 minutes were spent with patient including history, physical exam, and education.    Kirk Lino MD

## 2025-03-18 ENCOUNTER — APPOINTMENT (OUTPATIENT)
Dept: SURGERY | Facility: CLINIC | Age: 35
End: 2025-03-18
Payer: COMMERCIAL

## 2025-03-20 ENCOUNTER — APPOINTMENT (OUTPATIENT)
Dept: SURGERY | Facility: CLINIC | Age: 35
End: 2025-03-20
Payer: COMMERCIAL

## 2025-03-20 VITALS — WEIGHT: 256 LBS | BODY MASS INDEX: 45.35 KG/M2

## 2025-03-20 DIAGNOSIS — Z98.84 BARIATRIC SURGERY STATUS: ICD-10-CM

## 2025-03-20 DIAGNOSIS — E66.01 MORBID OBESITY (MULTI): ICD-10-CM

## 2025-03-20 PROCEDURE — 99205 OFFICE O/P NEW HI 60 MIN: CPT | Performed by: SURGERY

## 2025-04-11 ENCOUNTER — ANESTHESIA EVENT (OUTPATIENT)
Dept: GASTROENTEROLOGY | Facility: HOSPITAL | Age: 35
End: 2025-04-11
Payer: COMMERCIAL

## 2025-04-11 ENCOUNTER — APPOINTMENT (OUTPATIENT)
Dept: SURGERY | Facility: CLINIC | Age: 35
End: 2025-04-11
Payer: COMMERCIAL

## 2025-04-11 ENCOUNTER — ANESTHESIA (OUTPATIENT)
Dept: GASTROENTEROLOGY | Facility: HOSPITAL | Age: 35
End: 2025-04-11
Payer: COMMERCIAL

## 2025-04-11 ENCOUNTER — HOSPITAL ENCOUNTER (OUTPATIENT)
Dept: GASTROENTEROLOGY | Facility: HOSPITAL | Age: 35
Discharge: HOME | End: 2025-04-11
Payer: COMMERCIAL

## 2025-04-11 VITALS
RESPIRATION RATE: 16 BRPM | WEIGHT: 256 LBS | HEIGHT: 63 IN | OXYGEN SATURATION: 98 % | SYSTOLIC BLOOD PRESSURE: 122 MMHG | BODY MASS INDEX: 45.36 KG/M2 | DIASTOLIC BLOOD PRESSURE: 88 MMHG | TEMPERATURE: 97 F | HEART RATE: 78 BPM

## 2025-04-11 DIAGNOSIS — Z98.84 BARIATRIC SURGERY STATUS: ICD-10-CM

## 2025-04-11 DIAGNOSIS — K29.90 GASTRITIS AND DUODENITIS: Primary | ICD-10-CM

## 2025-04-11 LAB — PREGNANCY TEST URINE, POC: NEGATIVE

## 2025-04-11 PROCEDURE — 7100000009 HC PHASE TWO TIME - INITIAL BASE CHARGE

## 2025-04-11 PROCEDURE — 7100000010 HC PHASE TWO TIME - EACH INCREMENTAL 1 MINUTE

## 2025-04-11 PROCEDURE — 43239 EGD BIOPSY SINGLE/MULTIPLE: CPT | Performed by: SURGERY

## 2025-04-11 PROCEDURE — 7100000001 HC RECOVERY ROOM TIME - INITIAL BASE CHARGE

## 2025-04-11 PROCEDURE — 2500000004 HC RX 250 GENERAL PHARMACY W/ HCPCS (ALT 636 FOR OP/ED): Performed by: ANESTHESIOLOGIST ASSISTANT

## 2025-04-11 PROCEDURE — 81025 URINE PREGNANCY TEST: CPT | Performed by: ANESTHESIOLOGY

## 2025-04-11 PROCEDURE — 7100000002 HC RECOVERY ROOM TIME - EACH INCREMENTAL 1 MINUTE

## 2025-04-11 PROCEDURE — 3700000002 HC GENERAL ANESTHESIA TIME - EACH INCREMENTAL 1 MINUTE

## 2025-04-11 PROCEDURE — 3700000001 HC GENERAL ANESTHESIA TIME - INITIAL BASE CHARGE

## 2025-04-11 RX ORDER — FENTANYL CITRATE 50 UG/ML
50 INJECTION, SOLUTION INTRAMUSCULAR; INTRAVENOUS EVERY 5 MIN PRN
OUTPATIENT
Start: 2025-04-11

## 2025-04-11 RX ORDER — LIDOCAINE HYDROCHLORIDE 10 MG/ML
0.1 INJECTION, SOLUTION INFILTRATION; PERINEURAL ONCE
OUTPATIENT
Start: 2025-04-11 | End: 2025-04-11

## 2025-04-11 RX ORDER — ALBUTEROL SULFATE 0.83 MG/ML
2.5 SOLUTION RESPIRATORY (INHALATION) ONCE AS NEEDED
OUTPATIENT
Start: 2025-04-11

## 2025-04-11 RX ORDER — MIDAZOLAM HYDROCHLORIDE 1 MG/ML
1 INJECTION, SOLUTION INTRAMUSCULAR; INTRAVENOUS ONCE AS NEEDED
OUTPATIENT
Start: 2025-04-11

## 2025-04-11 RX ORDER — HYDRALAZINE HYDROCHLORIDE 20 MG/ML
5 INJECTION INTRAMUSCULAR; INTRAVENOUS EVERY 30 MIN PRN
OUTPATIENT
Start: 2025-04-11

## 2025-04-11 RX ORDER — SODIUM CHLORIDE, SODIUM LACTATE, POTASSIUM CHLORIDE, CALCIUM CHLORIDE 600; 310; 30; 20 MG/100ML; MG/100ML; MG/100ML; MG/100ML
100 INJECTION, SOLUTION INTRAVENOUS CONTINUOUS
OUTPATIENT
Start: 2025-04-11 | End: 2025-04-11

## 2025-04-11 RX ORDER — PROPOFOL 10 MG/ML
INJECTION, EMULSION INTRAVENOUS AS NEEDED
Status: DISCONTINUED | OUTPATIENT
Start: 2025-04-11 | End: 2025-04-11

## 2025-04-11 RX ORDER — OMEPRAZOLE 40 MG/1
40 CAPSULE, DELAYED RELEASE ORAL
Qty: 30 CAPSULE | Refills: 1 | Status: SHIPPED | OUTPATIENT
Start: 2025-04-11 | End: 2025-06-10

## 2025-04-11 RX ORDER — ONDANSETRON HYDROCHLORIDE 2 MG/ML
4 INJECTION, SOLUTION INTRAVENOUS ONCE AS NEEDED
OUTPATIENT
Start: 2025-04-11

## 2025-04-11 RX ADMIN — PROPOFOL 100 MG: 10 INJECTION, EMULSION INTRAVENOUS at 12:29

## 2025-04-11 RX ADMIN — PROPOFOL 100 MG: 10 INJECTION, EMULSION INTRAVENOUS at 12:31

## 2025-04-11 SDOH — HEALTH STABILITY: MENTAL HEALTH: CURRENT SMOKER: 0

## 2025-04-11 ASSESSMENT — COLUMBIA-SUICIDE SEVERITY RATING SCALE - C-SSRS
2. HAVE YOU ACTUALLY HAD ANY THOUGHTS OF KILLING YOURSELF?: NO
6. HAVE YOU EVER DONE ANYTHING, STARTED TO DO ANYTHING, OR PREPARED TO DO ANYTHING TO END YOUR LIFE?: NO
1. IN THE PAST MONTH, HAVE YOU WISHED YOU WERE DEAD OR WISHED YOU COULD GO TO SLEEP AND NOT WAKE UP?: NO

## 2025-04-11 ASSESSMENT — PAIN - FUNCTIONAL ASSESSMENT: PAIN_FUNCTIONAL_ASSESSMENT: 0-10

## 2025-04-11 ASSESSMENT — PAIN SCALES - GENERAL
PAIN_LEVEL: 2
PAINLEVEL_OUTOF10: 0 - NO PAIN

## 2025-04-11 NOTE — ANESTHESIA POSTPROCEDURE EVALUATION
Patient: Meghan Winston    Procedure Summary       Date: 04/11/25 Room / Location: [facility]    Anesthesia Start: 1223 Anesthesia Stop: 1250    Procedure: EGD Diagnosis: Bariatric surgery status    Scheduled Providers: Kirk LANGLEY MD; Alan Valenzuela MD; ELLIOT García Responsible Provider: Alan Valenzuela MD    Anesthesia Type: MAC ASA Status: 2            Anesthesia Type: MAC    Vitals Value Taken Time   /81 04/11/25 1250   Temp 36.1 °C (97 °F) 04/11/25 1242   Pulse 86 04/11/25 1250   Resp 20 04/11/25 1250   SpO2 97 % 04/11/25 1250       Anesthesia Post Evaluation    Patient location during evaluation: PACU  Patient participation: complete - patient participated  Level of consciousness: sleepy but conscious  Pain score: 2  Pain management: adequate  Multimodal analgesia pain management approach  Airway patency: patent  Cardiovascular status: acceptable  Respiratory status: acceptable  Hydration status: acceptable  Postoperative Nausea and Vomiting: none        No notable events documented.

## 2025-04-11 NOTE — ANESTHESIA PREPROCEDURE EVALUATION
Patient: Meghan Winston    Procedure Information       Date/Time: 04/11/25 1230    Scheduled providers: Kirk LANGLEY MD; Alan Valenzuela MD; ELLIOT García    Procedure: EGD    Location: [facility]            Relevant Problems   Endocrine   (+) Morbid obesity (Multi)       Clinical information reviewed:                   NPO Detail:  No data recorded     Physical Exam    Airway  Mallampati: II  TM distance: >3 FB  Neck ROM: full     Cardiovascular - normal exam     Dental - normal exam     Pulmonary - normal exam     Abdominal - normal exam             Anesthesia Plan    History of general anesthesia?: yes  History of complications of general anesthesia?: no    ASA 2     MAC     The patient is not a current smoker.  Patient was not previously instructed to abstain from smoking on day of procedure.  Patient did not smoke on day of procedure.  Education provided regarding risk of obstructive sleep apnea.  intravenous induction   Postoperative administration of opioids is intended.  Trial extubation is planned.  Anesthetic plan and risks discussed with patient.  Use of blood products discussed with patient who consented to blood products.    Plan discussed with ELLIOT, attending and CRNA.

## 2025-04-14 ENCOUNTER — TELEPHONE (OUTPATIENT)
Facility: CLINIC | Age: 35
End: 2025-04-14
Payer: COMMERCIAL

## 2025-04-15 ENCOUNTER — APPOINTMENT (OUTPATIENT)
Dept: SURGERY | Facility: CLINIC | Age: 35
End: 2025-04-15
Payer: COMMERCIAL

## 2025-04-15 VITALS — WEIGHT: 252 LBS | BODY MASS INDEX: 44.64 KG/M2

## 2025-04-15 NOTE — PROGRESS NOTES
PREOPERATIVE, MULTIDISCIPLINARY, MEDICALLY SUPERVISED, REDUCED CALORIE DIET, BEHAVIOR MODIFICATION AND EXERCISE PROGRAM     S: Meghan is following up virtually. She reports that she has been working on incorporating a breakfast and lunch meal this past month. She reports that for breakfast, she has been able to grab a Kind bar from the [email or web address] while on her way to drop off her kid at school. She reports that lunch has been more of a struggle d/t having to go out between 12-1 pm to  her fiance. She reports that she is also working to reduce liquid calories by switching to the small size soda cans. Meghan reports that she has slowed down on smoking, but will seek more methods to help quit when she sees her PCP.     Diet recall: mostly 2 meals/day this past month   Breakfast: a Kind bar   Lunch: mostly skipped   Dinner: yesterday had baked fish, broccoli and mashed potatoes   Snacks: not addressed.   Beverages: water, Vitamin water, small cans of Ginger Ale      Exercise: using at home machines/doing squats with resistance band 2x/week for 15-30 minutes.     O: Today's wt: 252 lbs Ht: 63 In. BMI: 45.35  Initial start weight: 251 lbs   Over the course of the program, pt has a goal to lose 5% of their body weight.     A/P: Pts weight reflects a 1 lb gain since last appt. Today's Lesson: Reviewed patient's dietary changes and food recall. Reviewed better protein bar alteratives to incorporate for breakfast/lunch meal with higher protein, lower sugar. Reviewed working lunch meal into schedule - before she leaves the house, or when she gets back. Reviewed continuing to reduce soda, and switching to vitamin water zero.   Diet Goals: Practice the lifelong rules  Exercise Recommendations: Gradually work up to 150 minutes of moderate intensity exercise per week.  Behavior Goals:  1. Switch to a higher protein, lower sugar bar option.   2. Incorporate a lunch meal.   3. Continue to reduce liquid calories (vitamin  water, soda).   4. Stay consistent with current exercise routine.     Will follow up next month. Will continue to monitor pt's weight, dietary & behavior changes.        Sheri Ennis RD, LDN  Bariatric Dietitian  [phone]

## 2025-04-16 ENCOUNTER — DOCUMENTATION (OUTPATIENT)
Dept: SURGERY | Facility: CLINIC | Age: 35
End: 2025-04-16
Payer: COMMERCIAL

## 2025-04-18 LAB
LAB AP ASR DISCLAIMER: NORMAL
LABORATORY COMMENT REPORT: NORMAL
PATH REPORT.FINAL DX SPEC: NORMAL
PATH REPORT.GROSS SPEC: NORMAL
PATH REPORT.RELEVANT HX SPEC: NORMAL
PATH REPORT.TOTAL CANCER: NORMAL

## 2025-04-25 LAB
ELECTRONICALLY SIGNED BY: NORMAL
H. PYLORI DRUG SUSCEPTIBILITY RESULTS: NORMAL

## 2025-04-25 PROCEDURE — 87900 PHENOTYPE INFECT AGENT DRUG: CPT | Performed by: SURGERY

## 2025-04-28 DIAGNOSIS — A04.8 H. PYLORI INFECTION: ICD-10-CM

## 2025-04-28 RX ORDER — METRONIDAZOLE 500 MG/1
500 TABLET ORAL 3 TIMES DAILY
Qty: 42 TABLET | Refills: 0 | Status: SHIPPED | OUTPATIENT
Start: 2025-04-28 | End: 2025-05-12

## 2025-04-28 RX ORDER — OMEPRAZOLE 40 MG/1
40 CAPSULE, DELAYED RELEASE ORAL
Qty: 28 CAPSULE | Refills: 0 | Status: SHIPPED | OUTPATIENT
Start: 2025-04-28 | End: 2025-05-12

## 2025-04-28 RX ORDER — TETRACYCLINE HYDROCHLORIDE 500 MG/1
500 TABLET, FILM COATED ORAL 4 TIMES DAILY
Qty: 56 EACH | Refills: 0 | Status: SHIPPED | OUTPATIENT
Start: 2025-04-28 | End: 2025-05-12

## 2025-04-28 NOTE — PROGRESS NOTES
Attempted to call patient to discuss her positive H pylori results.  I was unable to leave a VM.  Message sent via [employer] for patient with medication explanation and some information about H pylori infections.

## 2025-04-30 NOTE — PROGRESS NOTES
Patient: Meghan Winston  : 1990 AGE: 35 y.o. SEX:female   MRN: [id number]   Provider: AARON Livingston     Location [facility]   Service Date: 2025     PCP: No Assigned PCP Generic Provider, MD   Referred by: Kirk Lino MD          [facility]  New Visit Note    Virtual or Telephone Consent  An interactive audio and video telecommunication system which permits real time communications between the patient (at the originating site) and provider (at the distant site) was utilized to provide this telehealth service.   Verbal consent was requested and obtained from Meghan Winston on this date, 25 for a telehealth visit and the patient's location was confirmed at the time of the visit.     HISTORY OF PRESENT ILLNESS     Meghan Winston is a 35 y.o. female with a h/o Obesity and GERD who presents to [facility].    25: NPV with concerns of preoperative sleep evaluation, referred by Dr. Lino for pending gastric weight loss surgery. Reports hx of previous snoring, unsure if she still does as she sleep alone. Otherwise asymptomatic. ----> PSG    SLEEP-WAKE SCHEDULE    Sleep Patterns: She does have a usual bed partner. In terms of the patient's sleep/wake cycle, she generally gets into bed at approximately 10-11 PM.  her latency to sleep onset after lights out is <10 min. During the night, the patient generally awakens zero times nightly. Final wake time on weekday mornings is around 6:30 AM. No naps.     Compared to weekdays, the patient's sleep schedule is  similar on the weekends. Wake time 8AM.    Breathing during sleep: snoring  Behaviors at night: No   Sleep paralysis: No   Hypnogogic or hypnopompic hallucinations: No   Cataplexy: No     RLS screen: Patient denies RLS symptoms.    Daytime Symptoms:  On awakening patient reports: waking refreshed    Sleep environment:  Preferred sleep position:  side  Room is dark: Yes  Room is quiet: Yes  Room is cool: Yes  Bed comfort: good    SLEEP HABITS  Caffeine consumption: Yes, Patient consumes caffeine beverage occasionally  Alcohol consumption: Yes, Patient consumes alcohol regularly, about 2-4 drink(s)/week.  Smoking: Yes, smoking black and mild cigars- 1 per day   Marijuana: No  Sleep aids: denies     WEIGHT: fluctuating    ESS: 0      REVIEW OF SYSTEMS     All other systems have been reviewed and are negative.    ALLERGIES     Allergies[1]    MEDICATIONS     Current Medications[2]    PAST HISTORIES     PERTINENT PAST MEDICAL HISTORY: See HPI    PERTINENT PAST SURGICAL HISTORY for Sleep Medicine:  non-contributory    PERTINENT FAMILY HISTORY for Sleep Medicine:  Patient denies family history of any sleep disorder.    PERTINENT SOCIAL HISTORY:  She  reports that she has been smoking cigars. She has never used smokeless tobacco. She reports that she does not currently use alcohol after a past usage of about 3.0 standard drinks of alcohol per week. She reports that she does not currently use drugs after having used the following drugs: Marijuana. Frequency: 1.00 time per week.     Active Problems, Allergy List, Medication List, and PMH/PSH/FH/Social Hx have been reviewed and reconciled in chart. No significant changes unless documented in the pertinent chart section. Updates made when necessary.     PHYSICAL EXAM     VITAL SIGNS: LMP 03/23/2025 (Exact Date)     CURRENT WEIGHT: There were no vitals filed for this visit.     PREVIOUS WEIGHTS:  Wt Readings from Last 3 Encounters:   04/15/25 114 kg (252 lb)   04/11/25 116 kg (256 lb)   03/20/25 116 kg (256 lb)     Limited telehealth examination  PHYSICAL EXAMINATION  General appearance: awake alert in NAD  Affect: normal  HEENT: Nasal congestion absent  Lungs: no cough.  Neuro: normal speech      RESULTS/DATA     Iron (ug/dL)   Date Value   03/24/2023 48     Iron Saturation (%)   Date Value   03/24/2023 13 (L)     TIBC  (ug/dL)   Date Value   03/24/2023 371     Ferritin (ug/L)   Date Value   03/24/2023 48       Bicarbonate   Date Value Ref Range Status   06/04/2023 25 21 - 32 mmol/L Final       ASSESSMENT/PLAN     Ms. Winston is a 35 y.o. female and She was referred to the [facility] for evaluation of possible sleep disordered breathing and bariatric sx clearance    Problem List, Orders, Assessment, Recommendations:    #Sleep disordered breathing/suspected RANDY  - Due to high pre-test probability, I recommend split-night PSG (Split if AHI >10) to evaluate for RANDY.  - Will call with results and order PAP set up via [facility] after SS (if positive)  - Sleep apnea, PAP therapy education as well as the tips to be successful with PAP treatment was provided at length in clinic today. Patient verbalized understanding.  - Discussed 30-day mask guarantee and insurance requirement regarding PAP compliance and follow-up.   - Diet, exercise, and weight loss were emphasized today in clinic, as were non-supine sleep, avoiding alcohol in the late evening, and driving or operating heavy machinery when sleepy.      #BARIATRIC SURGERY STATUS  - we discussed the process for bariatric surgery and clearance  - if sleep study is negative, patient will be cleared by Sleep Medicine, if positive see below:  - if positive, will start on CPAP therapy, will have to order CPAP machine (~2 -3 weeks for machine), after receiving machine will need to show 30 days of compliance, discussed the reason behind utilizing CPAP to maximize healing and weight loss process  - encouraged patient to keep track of steps of process and ensure close follow-up with Sleep medicine providers  - patient verbalized understanding.      #Obesity  BMI Readings from Last 1 Encounters:   04/15/25 44.64 kg/m²     - Encouraged healthy weight loss via diet and exercise  - Weight loss can help in the long term treatment of RANDY.  - Defer management to  bariatrics    #Cigar Smoker  - 1 black and mild per day  - Smoking cessation strongly encouraged, patient aware must be nicotine free for surgery     All of patient's questions were answered. She verbalizes understanding and agreement with my assessment and plan.    Disposition    Follow up 31-90 days after starting PAP therapy if sleep study positive           [1]   Allergies  Allergen Reactions    Bee Pollen Itching    Shellfish Derived Hives    Egg Hives, Unknown and Rash    Ibuprofen Hives, Itching and Rash   [2]   Current Outpatient Medications   Medication Sig Dispense Refill    metroNIDAZOLE (Flagyl) 500 mg tablet Take 1 tablet (500 mg) by mouth 3 times a day for 14 days. 42 tablet 0    omeprazole (PriLOSEC) 40 mg DR capsule Take 1 capsule (40 mg) by mouth once daily in the morning. Take before meals. Do not crush or chew. 30 capsule 1    omeprazole (PriLOSEC) 40 mg DR capsule Take 1 capsule (40 mg) by mouth 2 times a day before meals for 14 days. Do not crush or chew. 28 capsule 0    tetracycline 500 mg tablet Take 500 mg by mouth 4 times a day for 14 days. 56 each 0     No current facility-administered medications for this visit.

## 2025-05-05 ENCOUNTER — APPOINTMENT (OUTPATIENT)
Dept: CARDIOLOGY | Facility: HOSPITAL | Age: 35
End: 2025-05-05
Payer: COMMERCIAL

## 2025-05-08 ENCOUNTER — APPOINTMENT (OUTPATIENT)
Facility: CLINIC | Age: 35
End: 2025-05-08
Payer: COMMERCIAL

## 2025-05-08 DIAGNOSIS — F17.290 CIGAR SMOKER: ICD-10-CM

## 2025-05-08 DIAGNOSIS — E66.01 MORBID OBESITY (MULTI): ICD-10-CM

## 2025-05-08 DIAGNOSIS — Z98.84 BARIATRIC SURGERY STATUS: ICD-10-CM

## 2025-05-08 DIAGNOSIS — G47.30 SLEEP DISORDER BREATHING: Primary | ICD-10-CM

## 2025-05-08 PROCEDURE — 99204 OFFICE O/P NEW MOD 45 MIN: CPT | Performed by: NURSE PRACTITIONER

## 2025-05-08 PROCEDURE — G8433 SCR FOR DEP NOT CPT DOC RSN: HCPCS | Performed by: NURSE PRACTITIONER

## 2025-05-08 ASSESSMENT — COLUMBIA-SUICIDE SEVERITY RATING SCALE - C-SSRS
6. HAVE YOU EVER DONE ANYTHING, STARTED TO DO ANYTHING, OR PREPARED TO DO ANYTHING TO END YOUR LIFE?: NO
2. HAVE YOU ACTUALLY HAD ANY THOUGHTS OF KILLING YOURSELF?: NO
1. IN THE PAST MONTH, HAVE YOU WISHED YOU WERE DEAD OR WISHED YOU COULD GO TO SLEEP AND NOT WAKE UP?: NO

## 2025-05-08 ASSESSMENT — SLEEP AND FATIGUE QUESTIONNAIRES
HOW LIKELY ARE YOU TO NOD OFF OR FALL ASLEEP WHILE LYING DOWN TO REST IN THE AFTERNOON WHEN CIRCUMSTANCES PERMIT: WOULD NEVER DOZE
HOW LIKELY ARE YOU TO NOD OFF OR FALL ASLEEP WHILE WATCHING TV: WOULD NEVER DOZE
HOW LIKELY ARE YOU TO NOD OFF OR FALL ASLEEP WHEN YOU ARE A PASSENGER IN A CAR FOR AN HOUR WITHOUT A BREAK: WOULD NEVER DOZE
ESS-CHAD TOTAL SCORE: 0
HOW LIKELY ARE YOU TO NOD OFF OR FALL ASLEEP WHILE SITTING AND TALKING TO SOMEONE: WOULD NEVER DOZE
HOW LIKELY ARE YOU TO NOD OFF OR FALL ASLEEP IN A CAR, WHILE STOPPED FOR A FEW MINUTES IN TRAFFIC: WOULD NEVER DOZE
HOW LIKELY ARE YOU TO NOD OFF OR FALL ASLEEP WHILE SITTING AND READING: WOULD NEVER DOZE
HOW LIKELY ARE YOU TO NOD OFF OR FALL ASLEEP WHILE SITTING QUIETLY AFTER LUNCH WITHOUT ALCOHOL: WOULD NEVER DOZE
SITING INACTIVE IN A PUBLIC PLACE LIKE A CLASS ROOM OR A MOVIE THEATER: WOULD NEVER DOZE

## 2025-05-08 ASSESSMENT — PATIENT HEALTH QUESTIONNAIRE - PHQ9
SUM OF ALL RESPONSES TO PHQ9 QUESTIONS 1 AND 2: 0
2. FEELING DOWN, DEPRESSED OR HOPELESS: NOT AT ALL
1. LITTLE INTEREST OR PLEASURE IN DOING THINGS: NOT AT ALL

## 2025-05-08 ASSESSMENT — ENCOUNTER SYMPTOMS
LOSS OF SENSATION IN FEET: 0
DEPRESSION: 0
OCCASIONAL FEELINGS OF UNSTEADINESS: 0

## 2025-05-12 PROBLEM — F31.30 BIPOLAR DISORDER, MOST RECENT EPISODE DEPRESSED (MULTI): Status: ACTIVE | Noted: 2025-05-12

## 2025-05-12 PROBLEM — G47.33 OBSTRUCTIVE SLEEP APNEA (ADULT) (PEDIATRIC): Status: ACTIVE | Noted: 2025-05-12

## 2025-05-12 PROBLEM — E88.810 METABOLIC SYNDROME: Status: ACTIVE | Noted: 2025-05-12

## 2025-05-19 ENCOUNTER — APPOINTMENT (OUTPATIENT)
Dept: SURGERY | Facility: CLINIC | Age: 35
End: 2025-05-19
Payer: COMMERCIAL

## 2025-05-19 NOTE — PROGRESS NOTES
PREOPERATIVE, MULTIDISCIPLINARY, MEDICALLY SUPERVISED, REDUCED CALORIE DIET, BEHAVIOR MODIFICATION AND EXERCISE PROGRAM     S: Meghan is following up virtually. She reports that things are going okay this past month. She reports cooking more at home, and has reduced her smoking to 1 cigarette per week. She reports that she feels the urge to smoke, she will grab a piece of minty gum. Pts reported weight reflects a 1 lb loss since last appt.     Diet recall: 2-3 meals/day   Breakfast: a Kind bar   Lunch: sometimes skipping, or grabbing just a piece of fruit, or a turkey sandwich on wheat bread   Dinner: turkey chops with broccoli and yellow rice, or perch fish   Snacks: not addressed.   Beverages: water, Gatorade zero, zero soda   Exercise: less of using at home machines, but more walking more outside this past month.     O: Today's wt: 251 lbs Ht: 63 In. BMI: 44.46   Initial start weight: 251 lbs  Over the course of the program, pt has a goal to lose 5% of their body weight.     A/P: Pts weight reflects a 1 lb loss since last appt. Today's Lesson: Reviewed patient's dietary changes and food recall. Emphasized the importance of 3 meals/day with high protein choices. Meghan mentioned wanting to follow just a water and watermelon diet - we reviewed why I recommended against it. I also again reviewed high protein/low sugar options instead of the Kind bar. I emailed Meghan diet education papers.   Diet Goals: Practice the lifelong rules  Exercise Recommendations: Gradually work up to 150 minutes of moderate intensity exercise per week.  Behavior Goals:  1. Consistently incorporate 3 meals/day.   2. Ensure an intake of at least 20 grams of protein from lean choices.     Will follow up next month. Will continue to monitor pt's weight, dietary & behavior changes.        Sheri Ennis RD, LDN  Bariatric Dietitian  [phone]

## 2025-05-20 VITALS — HEIGHT: 63 IN | BODY MASS INDEX: 44.47 KG/M2 | WEIGHT: 251 LBS

## 2025-05-23 ENCOUNTER — DOCUMENTATION (OUTPATIENT)
Dept: SURGERY | Facility: CLINIC | Age: 35
End: 2025-05-23
Payer: COMMERCIAL

## 2025-05-30 ENCOUNTER — APPOINTMENT (OUTPATIENT)
Facility: CLINIC | Age: 35
End: 2025-05-30
Payer: COMMERCIAL

## 2025-05-30 RX ORDER — BUSPIRONE HYDROCHLORIDE 10 MG/1
10 TABLET ORAL 3 TIMES DAILY
COMMUNITY
Start: 2024-08-02

## 2025-05-30 RX ORDER — TRAZODONE HYDROCHLORIDE 50 MG/1
50 TABLET ORAL NIGHTLY
COMMUNITY
Start: 2024-08-02

## 2025-05-30 RX ORDER — LURASIDONE HYDROCHLORIDE 40 MG/1
40 TABLET, FILM COATED ORAL
COMMUNITY
Start: 2024-08-02

## 2025-06-02 ENCOUNTER — OFFICE VISIT (OUTPATIENT)
Dept: CARDIOLOGY | Facility: HOSPITAL | Age: 35
End: 2025-06-02
Payer: COMMERCIAL

## 2025-06-02 VITALS
OXYGEN SATURATION: 100 % | SYSTOLIC BLOOD PRESSURE: 124 MMHG | DIASTOLIC BLOOD PRESSURE: 84 MMHG | HEART RATE: 75 BPM | WEIGHT: 257 LBS | HEIGHT: 63 IN | BODY MASS INDEX: 45.54 KG/M2

## 2025-06-02 DIAGNOSIS — Z98.84 BARIATRIC SURGERY STATUS: ICD-10-CM

## 2025-06-02 DIAGNOSIS — Z01.810 PRE-OPERATIVE CARDIOVASCULAR EXAMINATION: Primary | ICD-10-CM

## 2025-06-02 DIAGNOSIS — E66.01 MORBID OBESITY (MULTI): ICD-10-CM

## 2025-06-02 PROCEDURE — 3008F BODY MASS INDEX DOCD: CPT | Performed by: STUDENT IN AN ORGANIZED HEALTH CARE EDUCATION/TRAINING PROGRAM

## 2025-06-02 PROCEDURE — 93005 ELECTROCARDIOGRAM TRACING: CPT | Performed by: STUDENT IN AN ORGANIZED HEALTH CARE EDUCATION/TRAINING PROGRAM

## 2025-06-02 PROCEDURE — 99204 OFFICE O/P NEW MOD 45 MIN: CPT | Performed by: STUDENT IN AN ORGANIZED HEALTH CARE EDUCATION/TRAINING PROGRAM

## 2025-06-02 PROCEDURE — 99212 OFFICE O/P EST SF 10 MIN: CPT | Performed by: STUDENT IN AN ORGANIZED HEALTH CARE EDUCATION/TRAINING PROGRAM

## 2025-06-02 ASSESSMENT — ENCOUNTER SYMPTOMS
SYNCOPE: 0
LOSS OF SENSATION IN FEET: 0
IRREGULAR HEARTBEAT: 0
PND: 0
PALPITATIONS: 0
NEAR-SYNCOPE: 0
SHORTNESS OF BREATH: 0
ORTHOPNEA: 0
CLAUDICATION: 0
DYSPNEA ON EXERTION: 0
OCCASIONAL FEELINGS OF UNSTEADINESS: 0
DEPRESSION: 0

## 2025-06-02 ASSESSMENT — PATIENT HEALTH QUESTIONNAIRE - PHQ9
2. FEELING DOWN, DEPRESSED OR HOPELESS: NOT AT ALL
SUM OF ALL RESPONSES TO PHQ9 QUESTIONS 1 AND 2: 0
1. LITTLE INTEREST OR PLEASURE IN DOING THINGS: NOT AT ALL

## 2025-06-02 ASSESSMENT — PAIN SCALES - GENERAL: PAINLEVEL_OUTOF10: 0-NO PAIN

## 2025-06-02 NOTE — PROGRESS NOTES
[facility] - General Cardiology Note                                                                                        Reason for Visit: New Patient Visit.  Referring Clinician: Holland     History of Present Illness  Meghan Winston is a 35 y.o. female with history of morbid obesity, history of prediabetes, who presents today for preoperative risk assessment for bariatric surgery    Patient reports that she has good functional capacity and is able to walk for more than 4 blocks and climb 2 flights of stairs without any shortness of breath or chest discomfort.  She denies any orthopnea, PND, lower extremity swelling, palpitations, syncope, presyncope.  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  She is trying to cut down on smoking cigarettes but she finds it hard to do.    Past Medical History  She has a past medical history of Bipolar depression (Multi).    Past Surgical History  She has a past surgical history that includes Tubal ligation (Bilateral).    Social History  She reports that she has quit smoking. Her smoking use included cigars. She has never used smokeless tobacco. She reports that she does not currently use alcohol after a past usage of about 3.0 standard drinks of alcohol per week. She reports that she does not currently use drugs after having used the following drugs: Marijuana. Frequency: 1.00 time per week.    Family History  Family History[1]    Allergies  Bee pollen, Shellfish derived, Egg, and Ibuprofen    Outpatient Medications  Current Outpatient Medications   Medication Instructions    busPIRone (BUSPAR) 10 mg, 3 times daily    lurasidone (LATUDA) 40 mg, Daily with breakfast    omeprazole (PRILOSEC) 40 mg, oral, Daily before breakfast, Do not crush or chew.    omeprazole (PRILOSEC) 40 mg, oral, 2 times daily before meals, Do not crush or chew.    traZODone (DESYREL) 50 mg, Nightly       Review of  "Systems  Review of Systems   Constitutional: Negative for malaise/fatigue.   Cardiovascular:  Negative for chest pain, claudication, cyanosis, dyspnea on exertion, irregular heartbeat, leg swelling, near-syncope, orthopnea, palpitations, paroxysmal nocturnal dyspnea and syncope.   Respiratory:  Negative for shortness of breath.        Last Recorded Vitals  Vitals:    06/02/25 1132   BP: 124/84   BP Location: Left arm   Patient Position: Sitting   BP Cuff Size: Large adult   Pulse: 75   SpO2: 100%   Weight: 117 kg (257 lb)   Height: 1.6 m (5' 3\")       Physical Examination  General: Obese, -American Female   HEENT: Normocephalic atraumatic, pupils equal and reactive to light, extraocular muscles intact, no conjunctival injection, oropharynx clear without exudates.  Neck: Normal carotid arterial pulses, no arterial bruits, no thyromegaly.  Cardiac: Regular rhythm and normal heart rate.  S1, S2 present and normal.  No murmurs, rubs, or gallops.  PMI is nondisplaced. Jugular venous pulsations are normal.  Pulmonary: Normal breath sounds, no increased work of breathing, no wheezes or crackles.  GI: Normal bowel sounds, abdominal aorta not enlarged, no hepatosplenomegaly, no abdominal bruits.  Lower extremities: No cyanosis, clubbing, or edema.  No xanthelasma present. Normal distal pulses.  Skin: Skin intact. No significant rashes or lesions present.  Neuro: Alert and oriented x 3, normal attention and cognition, no focal motor or sensory neurologic deficits.  Psych: Normal affect and mood.  Musculoskeletal: Normal gait normal muscle tone.    Laboratory Studies  Lab Results   Component Value Date    GLUCOSE 94 06/04/2023    CALCIUM 9.0 06/04/2023     (L) 06/04/2023    K 2.9 (LL) 06/04/2023    CO2 25 06/04/2023    CL 98 06/04/2023    BUN 4 (L) 06/04/2023    CREATININE 0.87 06/04/2023     Lab Results   Component Value Date    ALT 27 06/04/2023    AST 21 06/04/2023    ALKPHOS 72 06/04/2023    BILITOT 0.7 " "06/04/2023         Lab Results   Component Value Date    CHOL 197 03/24/2023     Lab Results   Component Value Date    HDL 42.5 03/24/2023     No results found for: \"LDLCALC\"  Lab Results   Component Value Date    TRIG 120 03/24/2023     No components found for: \"CHOLHDL\"  Lab Results   Component Value Date    HGBA1C 5.4 03/24/2023     No components found for: \"UACR\"    Cardiology Tests  ECG:   ECG 12 lead 06/2/2025   NSR, normal EKG     I personally reviewed the labs and discussed with the patient      Assessment and Plan  Meghan Winston is a 35 y.o. female with history of morbid obesity, history of prediabetes, who presents today for preoperative risk assessment for bariatric surgery    # Pre-operative risk assessment   Patient, undergoing intermediate risk procedure (bariatric surgery), no further cardiac testing is recommended as it is unlikely to change the outcome        Problem List Items Addressed This Visit          Endocrine/Metabolic    Morbid obesity (Multi)    Bariatric surgery status     Other Visit Diagnoses         Pre-operative cardiovascular examination    -  Primary                  Audelia Padilla MD           [1]   Family History  Problem Relation Name Age of Onset    Heart attack Mother  52    Glaucoma Father      Diabetes Mother's Sister      Asthma Mother's Sister      Glaucoma Mother's Sister      Hypertension Mother's Sister      Gout Mother's Sister      Brain Aneurysm Mother's Sister  54    Diabetes Mother's Brother      Breast cancer Neg Hx      Ovarian cancer Neg Hx      Colon cancer Neg Hx       "

## 2025-06-03 LAB
ATRIAL RATE: 77 BPM
P AXIS: 39 DEGREES
P OFFSET: 210 MS
P ONSET: 159 MS
PR INTERVAL: 138 MS
Q ONSET: 228 MS
QRS COUNT: 13 BEATS
QRS DURATION: 86 MS
QT INTERVAL: 402 MS
QTC CALCULATION(BAZETT): 454 MS
QTC FREDERICIA: 436 MS
R AXIS: 14 DEGREES
T AXIS: 20 DEGREES
T OFFSET: 429 MS
VENTRICULAR RATE: 77 BPM

## 2025-06-09 ENCOUNTER — APPOINTMENT (OUTPATIENT)
Dept: BEHAVIORAL HEALTH | Facility: CLINIC | Age: 35
End: 2025-06-09
Payer: COMMERCIAL

## 2025-06-09 DIAGNOSIS — Z98.84 BARIATRIC SURGERY STATUS: ICD-10-CM

## 2025-06-09 DIAGNOSIS — F54 PSYCHOLOGICAL FACTOR AFFECTING PHYSICAL CONDITION: ICD-10-CM

## 2025-06-09 DIAGNOSIS — E66.01 MORBID OBESITY (MULTI): ICD-10-CM

## 2025-06-09 DIAGNOSIS — F31.30 BIPOLAR DISORDER, MOST RECENT EPISODE DEPRESSED (MULTI): ICD-10-CM

## 2025-06-09 PROCEDURE — 90791 PSYCH DIAGNOSTIC EVALUATION: CPT | Performed by: PSYCHOLOGIST
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
## 2025-06-11 ENCOUNTER — DOCUMENTATION (OUTPATIENT)
Dept: SURGERY | Facility: CLINIC | Age: 35
End: 2025-06-11
Payer: COMMERCIAL

## 2025-06-19 ENCOUNTER — APPOINTMENT (OUTPATIENT)
Dept: SURGERY | Facility: CLINIC | Age: 35
End: 2025-06-19
Payer: COMMERCIAL

## 2025-06-19 VITALS — WEIGHT: 259 LBS | HEIGHT: 63 IN | BODY MASS INDEX: 45.89 KG/M2

## 2025-06-19 NOTE — PROGRESS NOTES
PREOPERATIVE, MULTIDISCIPLINARY, MEDICALLY SUPERVISED, REDUCED CALORIE DIET, BEHAVIOR MODIFICATION AND EXERCISE PROGRAM     S: Meghan is present in the office for follow up. She tells me that this past month has not been her best due to a recent death in the family, plus an increase in celebrations. She tells me that she is still only smoking 1 cigarette per week and hopes to try nicotine gum soon. She tells me that she knows what she needs to do to get back on track. She also notes that she made a follow up appt with the therapist as she finds that helpful.     Diet recall: maybe 2 meals/day   Breakfast: a banana and a granola bar   Lunch: unknown  Dinner: 4 pieces of baked chicken, some turkey meat, and a salad with cheese, bell peppers, croutons, and light ranch dressing   Snacks: not addressed.   Beverages: water, diet soda     Exercise: not addressed.     O: Today's wt: 259 lbs Ht: 63 In. BMI: 45.88  Initial start weight:   Over the course of the program, pt has a goal to lose 5% of their body weight.     A/P: Pts weight reflects an 8 lb gain since last appt. Today's Lesson: Reviewed patient's dietary changes and food recall. Provided a handout on recommended protein bars.   Diet Goals: Practice the lifelong rules  Exercise Recommendations: Gradually work up to 150 minutes of moderate intensity exercise per week.  Behavior Goals:  1. Switch to a protein bar instead of a granola bar.     Will follow up next month. Will continue to monitor pt's weight, dietary & behavior changes.        Sheri Ennis, [place], LDN  Bariatric Dietitian  [phone]

## 2025-06-25 ENCOUNTER — APPOINTMENT (OUTPATIENT)
Facility: HOSPITAL | Age: 35
End: 2025-06-25
Payer: COMMERCIAL

## 2025-06-25 VITALS
BODY MASS INDEX: 45.18 KG/M2 | HEART RATE: 86 BPM | TEMPERATURE: 97 F | HEIGHT: 63 IN | DIASTOLIC BLOOD PRESSURE: 76 MMHG | WEIGHT: 255 LBS | SYSTOLIC BLOOD PRESSURE: 108 MMHG | OXYGEN SATURATION: 94 %

## 2025-06-25 DIAGNOSIS — J45.20 MILD INTERMITTENT ASTHMA WITHOUT COMPLICATION (HHS-HCC): Primary | ICD-10-CM

## 2025-06-25 PROCEDURE — 99203 OFFICE O/P NEW LOW 30 MIN: CPT | Performed by: STUDENT IN AN ORGANIZED HEALTH CARE EDUCATION/TRAINING PROGRAM

## 2025-06-25 PROCEDURE — 3008F BODY MASS INDEX DOCD: CPT | Performed by: STUDENT IN AN ORGANIZED HEALTH CARE EDUCATION/TRAINING PROGRAM

## 2025-06-25 PROCEDURE — 99213 OFFICE O/P EST LOW 20 MIN: CPT | Performed by: STUDENT IN AN ORGANIZED HEALTH CARE EDUCATION/TRAINING PROGRAM

## 2025-06-25 ASSESSMENT — PAIN SCALES - GENERAL: PAINLEVEL_OUTOF10: 0-NO PAIN

## 2025-06-25 NOTE — PROGRESS NOTES
"  Medical record number: [id number]    Subjective   Patient ID: Meghan Winston is a 35 y.o. female who presents for Establish Care.    1. Bariatric surgery  Already established with key providers:  Surgeon  Behavioral health  Nutritionist  Needs screening labs, most of which are already ordered and she will obtain soon  Patient gives me the  standard form, which I will be able to complete pending lab work         Social History:  - Lives with 3 daughters, boyfriend who is FOB #4  - Feels safe YES  - Occupation: unemployed  - Tobacco or vaping: YES, down to 1 black n mild weekly  - Alcohol use: NO  - Marijuana use: NO  - Illicit drug use: NO    Family History:  Family History[1]  DM2 in two maternal aunts, 1 maternal uncle    Past Medical History:  Medical History[2]  Eczema of childhood    Past Surgical History:  Surgical History[3]   & tubal ligation (simultaneous)    Review of Systems   All other systems reviewed and are negative.      Objective   /76 (BP Location: Right arm, Patient Position: Sitting)   Pulse 86   Temp 36.1 °C (97 °F) (Temporal)   Ht 1.6 m (5' 3\")   Wt 116 kg (255 lb)   SpO2 94%   BMI 45.17 kg/m²     CBC:   Lab Results   Component Value Date    WBC 11.4 (H) 2023    RBC 3.64 (L) 2023    HGB 10.6 (L) 2023    HCT 30.0 (L) 2023    MCV 82 2023    MCHC 35.3 2023     2023     CBC w/Diff:   Lab Results   Component Value Date    WBC 11.4 (H) 2023    NRBC 0.0 2023    RBC 3.64 (L) 2023    HGB 10.6 (L) 2023    HCT 30.0 (L) 2023    MCV 82 2023    MCHC 35.3 2023     2023    RDW 12.4 2023    NEUTOPHILPCT 68.5 2023    IGPCT 0.5 2023    LYMPHOPCT 19.5 2023    MONOPCT 11.0 2023    EOSPCT 0.2 2023    BASOPCT 0.3 2023    NEUTROABS 7.79 (H) 2023    LYMPHSABS 2.22 2023    MONOSABS 1.25 (H) 2023    EOSABS 0.02 2023    BASOSABS " 0.03 06/04/2023      CMP:  Lab Results   Component Value Date    GLUCOSE 94 06/04/2023     (L) 06/04/2023    K 2.9 (LL) 06/04/2023    CL 98 06/04/2023    CO2 25 06/04/2023    ANIONGAP 14 06/04/2023    BUN 4 (L) 06/04/2023    CREATININE 0.87 06/04/2023    GFRF 89 06/04/2023    CALCIUM 9.0 06/04/2023    ALBUMIN 3.6 06/04/2023    ALKPHOS 72 06/04/2023    PROT 7.4 06/04/2023    AST 21 06/04/2023    BILITOT 0.7 06/04/2023    ALT 27 06/04/2023      BMP:  Lab Results   Component Value Date    GLUCOSE 94 06/04/2023    BUN 4 (L) 06/04/2023    CREATININE 0.87 06/04/2023     (L) 06/04/2023    K 2.9 (LL) 06/04/2023    CL 98 06/04/2023    CO2 25 06/04/2023    ANIONGAP 14 06/04/2023    CALCIUM 9.0 06/04/2023     HgA1c:   Lab Results   Component Value Date    HGBA1C 5.4 03/24/2023    EDLZXMTP2K 108 03/24/2023     TSH:   Lab Results   Component Value Date    TSH 0.45 03/24/2023      Lipid:   Lab Results   Component Value Date    CHOL 197 03/24/2023    HDL 42.5 03/24/2023    CHHDL 4.6 03/24/2023    LDLF 131 (H) 03/24/2023    VLDL 24 03/24/2023    TRIG 120 03/24/2023     Physical Exam  Vitals reviewed.   Constitutional:       Appearance: Normal appearance. She is obese.   HENT:      Head: Normocephalic and atraumatic.      Mouth/Throat:      Mouth: Mucous membranes are moist.      Pharynx: Oropharynx is clear.   Eyes:      Extraocular Movements: Extraocular movements intact.      Conjunctiva/sclera: Conjunctivae normal.      Pupils: Pupils are equal, round, and reactive to light.   Cardiovascular:      Rate and Rhythm: Normal rate and regular rhythm.      Pulses: Normal pulses.      Heart sounds: Normal heart sounds.   Pulmonary:      Effort: Pulmonary effort is normal.      Breath sounds: Normal breath sounds.   Abdominal:      General: Abdomen is flat. Bowel sounds are normal.      Palpations: Abdomen is soft.   Musculoskeletal:         General: Normal range of motion.      Cervical back: Normal range of motion and neck  supple.   Skin:     General: Skin is warm and dry.      Capillary Refill: Capillary refill takes less than 2 seconds.   Neurological:      General: No focal deficit present.      Mental Status: She is alert.   Psychiatric:         Mood and Affect: Mood normal.         Behavior: Behavior normal.         Assessment/Plan   Problem List Items Addressed This Visit    None  Visit Diagnoses         Codes      Mild intermittent asthma without complication ([facility])    -  Primary J45.20    Relevant Medications    albuterol 90 mcg/actuation inhaler               Note: this documentation was entered into the electronic medical record using [employer] medical dictation software, and was not necessarily edited thereafter. Please excuse any errors of spelling or grammar.           [1]   Family History  Problem Relation Name Age of Onset    Heart attack Mother  52    Glaucoma Father      Diabetes Mother's Sister      Asthma Mother's Sister      Glaucoma Mother's Sister      Hypertension Mother's Sister      Gout Mother's Sister      Brain Aneurysm Mother's Sister  54    Diabetes Mother's Brother      Breast cancer Neg Hx      Ovarian cancer Neg Hx      Colon cancer Neg Hx      Glaucoma Father Miguel Winston     Asthma Father Miguel Winston     Depression Mother Alexandria Lee     Heart attack Mother Alexandria Lee     Mental illness Mother Alexandria Lee     Diabetes Mother's Brother Noearcenio Lee     Hypertension Mother's Sister Esther Lujan    [2]   Past Medical History:  Diagnosis Date    Allergic     Anxiety     Asthma     Bipolar depression (Multi)     Depression     Headache    [3]   Past Surgical History:  Procedure Laterality Date     SECTION, LOW TRANSVERSE  2022    TUBAL LIGATION Bilateral

## 2025-06-26 ENCOUNTER — PHARMACY VISIT (OUTPATIENT)
Dept: PHARMACY | Facility: CLINIC | Age: 35
End: 2025-06-26
Payer: MEDICAID

## 2025-06-26 PROCEDURE — RXMED WILLOW AMBULATORY MEDICATION CHARGE

## 2025-06-26 RX ORDER — ALBUTEROL SULFATE 90 UG/1
2 INHALANT RESPIRATORY (INHALATION) EVERY 6 HOURS PRN
Qty: 18 G | Refills: 11 | Status: SHIPPED | OUTPATIENT
Start: 2025-06-26 | End: 2026-06-26

## 2025-07-10 ENCOUNTER — DOCUMENTATION (OUTPATIENT)
Dept: SURGERY | Facility: CLINIC | Age: 35
End: 2025-07-10
Payer: COMMERCIAL

## 2025-07-15 PROCEDURE — RXMED WILLOW AMBULATORY MEDICATION CHARGE

## 2025-07-20 ENCOUNTER — CLINICAL SUPPORT (OUTPATIENT)
Dept: SLEEP MEDICINE | Facility: CLINIC | Age: 35
End: 2025-07-20
Payer: COMMERCIAL

## 2025-07-20 DIAGNOSIS — G47.33 OBSTRUCTIVE SLEEP APNEA (ADULT) (PEDIATRIC): ICD-10-CM

## 2025-07-20 DIAGNOSIS — Z98.84 BARIATRIC SURGERY STATUS: ICD-10-CM

## 2025-07-20 DIAGNOSIS — G47.30 SLEEP DISORDER BREATHING: ICD-10-CM

## 2025-07-20 DIAGNOSIS — E66.01 MORBID OBESITY (MULTI): ICD-10-CM

## 2025-07-20 PROCEDURE — 95810 POLYSOM 6/> YRS 4/> PARAM: CPT | Performed by: PSYCHIATRY & NEUROLOGY

## 2025-07-21 NOTE — PROGRESS NOTES
[facility] TECH NOTE:     Patient: Meghan Winston   MRN//AGE: [id number]  1990  35 y.o.   Technologist: Alba Quesada   Room: 441A   Service Date: 2025        Sleep Testing Location: [facility]:     TECHNOLOGIST SLEEP STUDY PROCEDURE NOTE:   This sleep study is being conducted according to the policies and procedures outlined by the AAS accreditation standards.  The sleep study procedure and processes involved during this appointment was explained to the patient/patient’s family, questions were answered. The patient/family verbalized understanding.      The patient is a 35 y.o. year old female scheduled for aDiagnostic PSG Split night with montage of:  PSG. she arrived for her appointment.      The study that was ultimately completed was aDiagnostic PSG Split night with montage of:  PSG.    The full study Was completed.  Patient questionnaires completed?: yes     Consents signed? yes    Initial Fall Risk Screening:     Meghan has not fallen in the last 6 months. her did not result in injury. Meghan does not have a fear of falling. He does not need assistance with sitting, standing, or walking. she does not need assistance walking in her home. she does not need assistance in an unfamiliar setting. The patient is notusing an assistive device.     Brief Study observations: PT did not qualify for split night d/t AHI < 10 CMS     Deviation to order/protocol and reason: N/A      If PAP, which was preferred mask/pressure/mode: N/A      Other:None    After the procedure, the patient/family was informed to ensure followup with ordering clinician for testing results.      Technologist: Alba Quesada

## 2025-07-23 ENCOUNTER — APPOINTMENT (OUTPATIENT)
Dept: BEHAVIORAL HEALTH | Facility: CLINIC | Age: 35
End: 2025-07-23
Payer: COMMERCIAL

## 2025-07-23 DIAGNOSIS — E66.01 MORBID OBESITY (MULTI): ICD-10-CM

## 2025-07-23 DIAGNOSIS — F54 PSYCHOLOGICAL FACTOR AFFECTING PHYSICAL CONDITION: ICD-10-CM

## 2025-07-23 DIAGNOSIS — Z98.84 BARIATRIC SURGERY STATUS: ICD-10-CM

## 2025-07-23 PROCEDURE — 90834 PSYTX W PT 45 MINUTES: CPT | Performed by: PSYCHOLOGIST

## 2025-07-23 ASSESSMENT — PROMIS GLOBAL HEALTH SCALE
CARRYOUT_SOCIAL_ACTIVITIES: EXCELLENT
RATE_SOCIAL_SATISFACTION: EXCELLENT
RATE_AVERAGE_PAIN: 2
EMOTIONAL_PROBLEMS: NEVER
CARRYOUT_PHYSICAL_ACTIVITIES: COMPLETELY
RATE_GENERAL_HEALTH: EXCELLENT
RATE_QUALITY_OF_LIFE: EXCELLENT
RATE_PHYSICAL_HEALTH: EXCELLENT
RATE_MENTAL_HEALTH: EXCELLENT

## 2025-07-23 ASSESSMENT — PATIENT HEALTH QUESTIONNAIRE - PHQ9
SUM OF ALL RESPONSES TO PHQ9 QUESTIONS 1 & 2: 0
1. LITTLE INTEREST OR PLEASURE IN DOING THINGS: NOT AT ALL
4. FEELING TIRED OR HAVING LITTLE ENERGY: NOT AT ALL
6. FEELING BAD ABOUT YOURSELF - OR THAT YOU ARE A FAILURE OR HAVE LET YOURSELF OR YOUR FAMILY DOWN: NOT AT ALL
5. POOR APPETITE OR OVEREATING: NOT AT ALL
2. FEELING DOWN, DEPRESSED OR HOPELESS: NOT AT ALL
7. TROUBLE CONCENTRATING ON THINGS, SUCH AS READING THE NEWSPAPER OR WATCHING TELEVISION: NOT AT ALL
3. TROUBLE FALLING OR STAYING ASLEEP: NOT AT ALL
SUM OF ALL RESPONSES TO PHQ QUESTIONS 1-9: 0
9. THOUGHTS THAT YOU WOULD BE BETTER OFF DEAD, OR OF HURTING YOURSELF: NOT AT ALL
8. MOVING OR SPEAKING SO SLOWLY THAT OTHER PEOPLE COULD HAVE NOTICED. OR THE OPPOSITE, BEING SO FIGETY OR RESTLESS THAT YOU HAVE BEEN MOVING AROUND A LOT MORE THAN USUAL: NOT AT ALL

## 2025-07-23 ASSESSMENT — ANXIETY QUESTIONNAIRES
GAD7 TOTAL SCORE: 0
1. FEELING NERVOUS, ANXIOUS, OR ON EDGE: NOT AT ALL
5. BEING SO RESTLESS THAT IT IS HARD TO SIT STILL: NOT AT ALL
2. NOT BEING ABLE TO STOP OR CONTROL WORRYING: NOT AT ALL
6. BECOMING EASILY ANNOYED OR IRRITABLE: NOT AT ALL
4. TROUBLE RELAXING: NOT AT ALL
3. WORRYING TOO MUCH ABOUT DIFFERENT THINGS: NOT AT ALL
7. FEELING AFRAID AS IF SOMETHING AWFUL MIGHT HAPPEN: NOT AT ALL
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
## 2025-07-24 ENCOUNTER — OFFICE VISIT (OUTPATIENT)
Facility: HOSPITAL | Age: 35
End: 2025-07-24
Payer: COMMERCIAL

## 2025-07-24 ENCOUNTER — PHARMACY VISIT (OUTPATIENT)
Dept: PHARMACY | Facility: CLINIC | Age: 35
End: 2025-07-24
Payer: MEDICAID

## 2025-07-24 VITALS
DIASTOLIC BLOOD PRESSURE: 84 MMHG | TEMPERATURE: 98 F | WEIGHT: 258 LBS | OXYGEN SATURATION: 98 % | SYSTOLIC BLOOD PRESSURE: 124 MMHG | BODY MASS INDEX: 45.71 KG/M2 | RESPIRATION RATE: 18 BRPM | HEIGHT: 63 IN | HEART RATE: 67 BPM

## 2025-07-24 DIAGNOSIS — Z71.6 ENCOUNTER FOR TOBACCO USE CESSATION COUNSELING: Primary | ICD-10-CM

## 2025-07-24 PROCEDURE — 99213 OFFICE O/P EST LOW 20 MIN: CPT | Performed by: STUDENT IN AN ORGANIZED HEALTH CARE EDUCATION/TRAINING PROGRAM

## 2025-07-24 PROCEDURE — RXMED WILLOW AMBULATORY MEDICATION CHARGE

## 2025-07-24 PROCEDURE — 3008F BODY MASS INDEX DOCD: CPT | Performed by: STUDENT IN AN ORGANIZED HEALTH CARE EDUCATION/TRAINING PROGRAM

## 2025-07-24 RX ORDER — BUPROPION HYDROCHLORIDE 150 MG/1
150 TABLET ORAL EVERY MORNING
Qty: 30 TABLET | Refills: 5 | Status: SHIPPED | OUTPATIENT
Start: 2025-07-24 | End: 2026-01-20

## 2025-07-24 ASSESSMENT — PAIN SCALES - GENERAL: PAINLEVEL_OUTOF10: 4

## 2025-07-24 NOTE — PROGRESS NOTES
"  Subjective   Patient ID: Meghan Winston is a 35 y.o. female who presents for tobacco cessation counseling.     1. Tobacco cessation counseling  Did not tolerate the nicotine gum peppery flavor  Neighbor had patches that she tried, but they came off her skin easily  Still currently using 1-2 Black n' Milds daily (2 max, most days is 1)  Social support from 17 yo daughter who wants her to quit  Extrinsic motivation, she cannot be smoking when bariatric surgery comes due  Father of her children does smoke as well, but keeps her honest about her commitment  Sister comes around socially and also smokes, and patient needs to have conversation with her about boundaries re: tobacco  Relates that she was seen at [facility] by psychiatry and psychology  Now only following with counselor, not psychiatry    A/  Review of meds: previously on Latuda, Buspar, and trazodone  Review of notes: she appears to have suffered from PTSD with psychosis during a period of chronic illness in a child, but has not exhbited any such symptoms in some time  Lungs CTAB    P/  [ ] wellbutrin  mg  [ ] counseled on synergy with idealizing social/external environment for better success in cessation         Review of Systems   All other systems reviewed and are negative.      Objective   /84 (BP Location: Right arm, Patient Position: Sitting, BP Cuff Size: Adult)   Pulse 67   Temp 36.7 °C (98 °F) (Temporal)   Resp 18   Ht 1.6 m (5' 3\")   Wt 117 kg (258 lb)   SpO2 98%   BMI 45.70 kg/m²     Physical Exam  Constitutional:       Appearance: Normal appearance. She is obese.      Comments: BMI 45   HENT:      Head: Normocephalic and atraumatic.      Mouth/Throat:      Mouth: Mucous membranes are moist.      Pharynx: Oropharynx is clear.     Eyes:      Extraocular Movements: Extraocular movements intact.      Conjunctiva/sclera: Conjunctivae normal.      Pupils: Pupils are equal, round, and reactive to light.       Cardiovascular: "      Rate and Rhythm: Normal rate and regular rhythm.      Pulses: Normal pulses.      Heart sounds: Normal heart sounds.   Pulmonary:      Effort: Pulmonary effort is normal.      Breath sounds: Normal breath sounds.   Abdominal:      General: Abdomen is flat. Bowel sounds are normal.      Palpations: Abdomen is soft.     Musculoskeletal:         General: Normal range of motion.      Cervical back: Normal range of motion and neck supple.     Skin:     General: Skin is warm and dry.      Capillary Refill: Capillary refill takes less than 2 seconds.     Neurological:      General: No focal deficit present.      Mental Status: She is alert.     Psychiatric:         Mood and Affect: Mood normal.         Behavior: Behavior normal.         Assessment/Plan   Problem List Items Addressed This Visit    None  Visit Diagnoses         Codes      Encounter for tobacco use cessation counseling    -  Primary Z71.6    Relevant Medications    buPROPion XL (Wellbutrin XL) 150 mg 24 hr tablet               Note: This documentaion was entered into the electronic medical record using [employer] medical dictation software, and was not necessarily edited thereafter. Please excuse any errors of spelling or grammar.

## 2025-07-25 LAB
25(OH)D3+25(OH)D2 SERPL-MCNC: 15 NG/ML (ref 30–100)
ALBUMIN SERPL-MCNC: 4.3 G/DL (ref 3.6–5.1)
ALP SERPL-CCNC: 52 U/L (ref 31–125)
ALT SERPL-CCNC: 11 U/L (ref 6–29)
ANION GAP SERPL CALCULATED.4IONS-SCNC: 9 MMOL/L (CALC) (ref 7–17)
APTT PPP: 29 SEC (ref 23–32)
AST SERPL-CCNC: 11 U/L (ref 10–30)
BASOPHILS # BLD AUTO: 34 CELLS/UL (ref 0–200)
BASOPHILS NFR BLD AUTO: 0.4 %
BILIRUB SERPL-MCNC: 0.5 MG/DL (ref 0.2–1.2)
BUN SERPL-MCNC: 10 MG/DL (ref 7–25)
C PEPTIDE SERPL-MCNC: 2.82 NG/ML (ref 0.8–3.85)
CALCIUM SERPL-MCNC: 9.1 MG/DL (ref 8.6–10.2)
CHLORIDE SERPL-SCNC: 105 MMOL/L (ref 98–110)
CHOLEST SERPL-MCNC: 206 MG/DL
CHOLEST/HDLC SERPL: 3.6 (CALC)
CO2 SERPL-SCNC: 20 MMOL/L (ref 20–32)
COPPER BLD-MCNC: NORMAL UG/DL
COTININE SERPL-MCNC: NORMAL NG/ML
CREAT SERPL-MCNC: 0.74 MG/DL (ref 0.5–0.97)
CRP SERPL-MCNC: 3.4 MG/L
EGFRCR SERPLBLD CKD-EPI 2021: 107 ML/MIN/1.73M2
EOSINOPHIL # BLD AUTO: 168 CELLS/UL (ref 15–500)
EOSINOPHIL NFR BLD AUTO: 2 %
ERYTHROCYTE [DISTWIDTH] IN BLOOD BY AUTOMATED COUNT: 14.1 % (ref 11–15)
EST. AVERAGE GLUCOSE BLD GHB EST-MCNC: 111 MG/DL
EST. AVERAGE GLUCOSE BLD GHB EST-SCNC: 6.2 MMOL/L
FERRITIN SERPL-MCNC: 31 NG/ML (ref 16–154)
FOLATE SERPL-MCNC: 3.3 NG/ML
GLUCOSE SERPL-MCNC: 91 MG/DL (ref 65–99)
HBA1C MFR BLD: 5.5 %
HCT VFR BLD AUTO: 42.9 % (ref 35–45)
HDLC SERPL-MCNC: 57 MG/DL
HGB BLD-MCNC: 13.9 G/DL (ref 11.7–15.5)
INR PPP: 0.9
IRON SATN MFR SERPL: 20 % (CALC) (ref 16–45)
IRON SERPL-MCNC: 74 MCG/DL (ref 40–190)
LDLC SERPL CALC-MCNC: 125 MG/DL (CALC)
LYMPHOCYTES # BLD AUTO: 2990 CELLS/UL (ref 850–3900)
LYMPHOCYTES NFR BLD AUTO: 35.6 %
MCH RBC QN AUTO: 31.1 PG (ref 27–33)
MCHC RBC AUTO-ENTMCNC: 32.4 G/DL (ref 32–36)
MCV RBC AUTO: 96 FL (ref 80–100)
MONOCYTES # BLD AUTO: 689 CELLS/UL (ref 200–950)
MONOCYTES NFR BLD AUTO: 8.2 %
NEUTROPHILS # BLD AUTO: 4519 CELLS/UL (ref 1500–7800)
NEUTROPHILS NFR BLD AUTO: 53.8 %
NICOTINE SERPL-MCNC: NORMAL NG/ML
NONHDLC SERPL-MCNC: 149 MG/DL (CALC)
PLATELET # BLD AUTO: 241 THOUSAND/UL (ref 140–400)
PMV BLD REES-ECKER: 9.8 FL (ref 7.5–12.5)
POTASSIUM SERPL-SCNC: 4.2 MMOL/L (ref 3.5–5.3)
PROT SERPL-MCNC: 7 G/DL (ref 6.1–8.1)
PROTHROMBIN TIME: 10.2 SEC (ref 9–11.5)
PTH-INTACT SERPL-MCNC: 46 PG/ML (ref 16–77)
RBC # BLD AUTO: 4.47 MILLION/UL (ref 3.8–5.1)
SODIUM SERPL-SCNC: 134 MMOL/L (ref 135–146)
T4 FREE SERPL-MCNC: 1 NG/DL (ref 0.8–1.8)
TIBC SERPL-MCNC: 377 MCG/DL (CALC) (ref 250–450)
TRIGL SERPL-MCNC: 129 MG/DL
TSH SERPL-ACNC: 0.74 MIU/L
UREA BREATH TEST QL: DETECTED
VIT A SERPL-MCNC: NORMAL UG/ML
VIT B1 BLD-SCNC: NORMAL NMOL/L
VIT B12 SERPL-MCNC: 347 PG/ML (ref 200–1100)
WBC # BLD AUTO: 8.4 THOUSAND/UL (ref 3.8–10.8)
ZINC SERPL-MCNC: NORMAL UG/ML

## 2025-07-28 DIAGNOSIS — G47.33 OSA (OBSTRUCTIVE SLEEP APNEA): Primary | ICD-10-CM

## 2025-07-28 NOTE — PROGRESS NOTES
PREOPERATIVE, MULTIDISCIPLINARY, MEDICALLY SUPERVISED, REDUCED CALORIE DIET, BEHAVIOR MODIFICATION AND EXERCISE PROGRAM     S: Meghan is following up virtually. She reports doing well overall, besides currently being sick with a cold which has caused a low appetite since this past Saturday. Meghan reports that she is continuing to follow up with her therapist weekly and that it is helpful. She states that she saw her PCP on 7/24 for tobacco use cessation counseling. She reports that she was started on Wellbutrin to help with smoking cravings and anxiety which has helped. Meghan tells me that she was able to try a 15 gram protein bar from [employer], however she did not know the brand name.   Her weight from the PCP appt on 7/24 reflects a 1 lb weight loss since our last appt.     Diet recall: prior to getting sick. Pt notes not really eating much since being sick.   Breakfast: a protein bar   Lunch: a protein bar   Dinner: not addressed.   Snacks: a protein bar   Beverages: water (pt estimates 4 bottles/day, or ~68 oz), a cup of orange juice daily, Gatorade Zero     Exercise: yard work and more movement at home     O: Today's wt: 258 lbs Ht: 63 In. BMI: 45.70  Initial start weight: 251 lbs  Over the course of the program, pt has a goal to lose 5% of their body weight.     A/P: Pts weight reflects a 1 lb loss since last appt. Today's Lesson: Reviewed patient's dietary changes and food recall. We reviewed the lifelong rules. I recommended that pt eliminate orange juice intake. I also advised pt to keep protein bar intake to 1 meal or snack. Reviewed our goal to get back down to at least Meghan's start weight of 251 lbs.   Diet Goals: Practice the lifelong rules  Exercise Recommendations: Gradually work up to 150 minutes of moderate intensity exercise per week.  Behavior Goals:  1. Eliminate orange juice.   2. Continue to incorporate 3 meals/day with at least 20 grams of lean protein/meal.     Will follow up next month. Will  continue to monitor pt's weight, dietary & behavior changes.        Sheri Ennis RD, LDN  Bariatric Dietitian  [phone]

## 2025-07-29 ENCOUNTER — TELEPHONE (OUTPATIENT)
Dept: SLEEP MEDICINE | Facility: HOSPITAL | Age: 35
End: 2025-07-29
Payer: COMMERCIAL

## 2025-07-29 ENCOUNTER — NUTRITION (OUTPATIENT)
Dept: SURGERY | Facility: CLINIC | Age: 35
End: 2025-07-29
Payer: COMMERCIAL

## 2025-07-29 VITALS — HEIGHT: 63 IN | BODY MASS INDEX: 45.71 KG/M2 | WEIGHT: 258 LBS

## 2025-07-29 LAB
AMPHETAMINES UR QL: NEGATIVE NG/ML
BARBITURATES UR QL: NEGATIVE NG/ML
BENZODIAZ UR QL: NEGATIVE NG/ML
BZE UR QL: NEGATIVE NG/ML
CREAT UR-MCNC: 97.1 MG/DL
DRUG SCREEN COMMENT UR-IMP: ABNORMAL
METHADONE UR QL: NEGATIVE NG/ML
OPIATES UR QL: NEGATIVE NG/ML
OXIDANTS UR QL: NEGATIVE MCG/ML
OXYCODONE UR QL: NEGATIVE NG/ML
PCP UR QL: NEGATIVE NG/ML
PH UR: 6.9 [PH] (ref 4.5–9)
QUEST NOTES AND COMMENTS: ABNORMAL
THC UR QL: POSITIVE NG/ML
THC UR-MCNC: 34 NG/ML

## 2025-07-29 NOTE — TELEPHONE ENCOUNTER
----- Message from Marely Rodriguez sent at 7/28/2025  3:19 PM EDT -----    Can you call this patient about their in-center diagnostic sleep study and tell them they have Moderate RANDY for which I have ordered APAP at 5-18 cwp via [facility]. Has FUV scheduled for bariatric clearance.     Thanks,  Marely

## 2025-08-01 LAB
25(OH)D3+25(OH)D2 SERPL-MCNC: 15 NG/ML (ref 30–100)
ALBUMIN SERPL-MCNC: 4.3 G/DL (ref 3.6–5.1)
ALP SERPL-CCNC: 52 U/L (ref 31–125)
ALT SERPL-CCNC: 11 U/L (ref 6–29)
ANION GAP SERPL CALCULATED.4IONS-SCNC: 9 MMOL/L (CALC) (ref 7–17)
APTT PPP: 29 SEC (ref 23–32)
AST SERPL-CCNC: 11 U/L (ref 10–30)
BASOPHILS # BLD AUTO: 34 CELLS/UL (ref 0–200)
BASOPHILS NFR BLD AUTO: 0.4 %
BILIRUB SERPL-MCNC: 0.5 MG/DL (ref 0.2–1.2)
BUN SERPL-MCNC: 10 MG/DL (ref 7–25)
C PEPTIDE SERPL-MCNC: 2.82 NG/ML (ref 0.8–3.85)
CALCIUM SERPL-MCNC: 9.1 MG/DL (ref 8.6–10.2)
CHLORIDE SERPL-SCNC: 105 MMOL/L (ref 98–110)
CHOLEST SERPL-MCNC: 206 MG/DL
CHOLEST/HDLC SERPL: 3.6 (CALC)
CO2 SERPL-SCNC: 20 MMOL/L (ref 20–32)
COPPER BLD-MCNC: 100 MCG/DL
COTININE SERPL-MCNC: 249 NG/ML
CREAT SERPL-MCNC: 0.74 MG/DL (ref 0.5–0.97)
CRP SERPL-MCNC: 3.4 MG/L
EGFRCR SERPLBLD CKD-EPI 2021: 107 ML/MIN/1.73M2
EOSINOPHIL # BLD AUTO: 168 CELLS/UL (ref 15–500)
EOSINOPHIL NFR BLD AUTO: 2 %
ERYTHROCYTE [DISTWIDTH] IN BLOOD BY AUTOMATED COUNT: 14.1 % (ref 11–15)
EST. AVERAGE GLUCOSE BLD GHB EST-MCNC: 111 MG/DL
EST. AVERAGE GLUCOSE BLD GHB EST-SCNC: 6.2 MMOL/L
FERRITIN SERPL-MCNC: 31 NG/ML (ref 16–154)
FOLATE SERPL-MCNC: 3.3 NG/ML
GLUCOSE SERPL-MCNC: 91 MG/DL (ref 65–99)
HBA1C MFR BLD: 5.5 %
HCT VFR BLD AUTO: 42.9 % (ref 35–45)
HDLC SERPL-MCNC: 57 MG/DL
HGB BLD-MCNC: 13.9 G/DL (ref 11.7–15.5)
INR PPP: 0.9
IRON SATN MFR SERPL: 20 % (CALC) (ref 16–45)
IRON SERPL-MCNC: 74 MCG/DL (ref 40–190)
LDLC SERPL CALC-MCNC: 125 MG/DL (CALC)
LYMPHOCYTES # BLD AUTO: 2990 CELLS/UL (ref 850–3900)
LYMPHOCYTES NFR BLD AUTO: 35.6 %
MCH RBC QN AUTO: 31.1 PG (ref 27–33)
MCHC RBC AUTO-ENTMCNC: 32.4 G/DL (ref 32–36)
MCV RBC AUTO: 96 FL (ref 80–100)
MONOCYTES # BLD AUTO: 689 CELLS/UL (ref 200–950)
MONOCYTES NFR BLD AUTO: 8.2 %
NEUTROPHILS # BLD AUTO: 4519 CELLS/UL (ref 1500–7800)
NEUTROPHILS NFR BLD AUTO: 53.8 %
NICOTINE SERPL-MCNC: 18 NG/ML
NONHDLC SERPL-MCNC: 149 MG/DL (CALC)
PLATELET # BLD AUTO: 241 THOUSAND/UL (ref 140–400)
PMV BLD REES-ECKER: 9.8 FL (ref 7.5–12.5)
POTASSIUM SERPL-SCNC: 4.2 MMOL/L (ref 3.5–5.3)
PROT SERPL-MCNC: 7 G/DL (ref 6.1–8.1)
PROTHROMBIN TIME: 10.2 SEC (ref 9–11.5)
PTH-INTACT SERPL-MCNC: 46 PG/ML (ref 16–77)
RBC # BLD AUTO: 4.47 MILLION/UL (ref 3.8–5.1)
SODIUM SERPL-SCNC: 134 MMOL/L (ref 135–146)
T4 FREE SERPL-MCNC: 1 NG/DL (ref 0.8–1.8)
TIBC SERPL-MCNC: 377 MCG/DL (CALC) (ref 250–450)
TRIGL SERPL-MCNC: 129 MG/DL
TSH SERPL-ACNC: 0.74 MIU/L
UREA BREATH TEST QL: DETECTED
VIT A SERPL-MCNC: 43 MCG/DL (ref 38–98)
VIT B1 BLD-SCNC: NORMAL NMOL/L
VIT B12 SERPL-MCNC: 347 PG/ML (ref 200–1100)
WBC # BLD AUTO: 8.4 THOUSAND/UL (ref 3.8–10.8)
ZINC SERPL-MCNC: 66 MCG/DL (ref 60–130)

## 2025-08-13 PROCEDURE — RXMED WILLOW AMBULATORY MEDICATION CHARGE

## 2025-08-20 PROCEDURE — RXMED WILLOW AMBULATORY MEDICATION CHARGE

## 2025-08-23 ENCOUNTER — PHARMACY VISIT (OUTPATIENT)
Dept: PHARMACY | Facility: CLINIC | Age: 35
End: 2025-08-23
Payer: MEDICAID

## 2025-08-29 ENCOUNTER — APPOINTMENT (OUTPATIENT)
Dept: SURGERY | Facility: CLINIC | Age: 35
End: 2025-08-29
Payer: COMMERCIAL

## 2025-09-02 ENCOUNTER — NUTRITION (OUTPATIENT)
Dept: SURGERY | Facility: CLINIC | Age: 35
End: 2025-09-02
Payer: COMMERCIAL

## 2025-09-02 VITALS — HEIGHT: 63 IN | BODY MASS INDEX: 45.7 KG/M2

## 2025-09-04 ENCOUNTER — APPOINTMENT (OUTPATIENT)
Facility: CLINIC | Age: 35
End: 2025-09-04
Payer: COMMERCIAL

## 2025-09-11 ENCOUNTER — APPOINTMENT (OUTPATIENT)
Dept: BEHAVIORAL HEALTH | Facility: CLINIC | Age: 35
End: 2025-09-11
Payer: COMMERCIAL

## 2025-09-24 ENCOUNTER — APPOINTMENT (OUTPATIENT)
Dept: BEHAVIORAL HEALTH | Facility: CLINIC | Age: 35
End: 2025-09-24
Payer: COMMERCIAL